# Patient Record
Sex: FEMALE | URBAN - METROPOLITAN AREA
[De-identification: names, ages, dates, MRNs, and addresses within clinical notes are randomized per-mention and may not be internally consistent; named-entity substitution may affect disease eponyms.]

---

## 2017-03-01 ASSESSMENT — ENCOUNTER SYMPTOMS
RESPIRATORY PAIN: 0
EYE WATERING: 0
MYALGIAS: 0
FLANK PAIN: 0
INCREASED ENERGY: 0
SHORTNESS OF BREATH: 0
SKIN CHANGES: 0
WEIGHT GAIN: 0
WEAKNESS: 0
POLYPHAGIA: 0
DYSPNEA ON EXERTION: 1
TROUBLE SWALLOWING: 0
JOINT SWELLING: 1
TASTE DISTURBANCE: 0
DIZZINESS: 1
EYE IRRITATION: 1
MUSCLE WEAKNESS: 0
HALLUCINATIONS: 0
SNORES LOUDLY: 0
BACK PAIN: 1
SORE THROAT: 0
CHILLS: 1
EYE REDNESS: 0
POLYDIPSIA: 0
SEIZURES: 0
HEADACHES: 0
TINGLING: 0
DIFFICULTY URINATING: 0
COUGH DISTURBING SLEEP: 1
NECK PAIN: 1
ALTERED TEMPERATURE REGULATION: 1
SPUTUM PRODUCTION: 1
SPEECH CHANGE: 0
HEMATURIA: 0
FEVER: 0
STIFFNESS: 1
NIGHT SWEATS: 0
MEMORY LOSS: 1
SINUS CONGESTION: 0
WEIGHT LOSS: 0
ARTHRALGIAS: 1
WHEEZING: 0
LOSS OF CONSCIOUSNESS: 0
TREMORS: 0
POSTURAL DYSPNEA: 0
HOARSE VOICE: 0
SMELL DISTURBANCE: 0
DYSURIA: 0
COUGH: 1
EYE PAIN: 1
DISTURBANCES IN COORDINATION: 1
POOR WOUND HEALING: 0
DOUBLE VISION: 0
MUSCLE CRAMPS: 0
SINUS PAIN: 0
NECK MASS: 0
PARALYSIS: 0
HEMOPTYSIS: 0
NUMBNESS: 0
FATIGUE: 1
DECREASED APPETITE: 0
NAIL CHANGES: 0

## 2017-03-03 ENCOUNTER — OFFICE VISIT (OUTPATIENT)
Dept: PULMONOLOGY | Facility: CLINIC | Age: 72
End: 2017-03-03
Attending: INTERNAL MEDICINE
Payer: MEDICARE

## 2017-03-03 VITALS
SYSTOLIC BLOOD PRESSURE: 147 MMHG | RESPIRATION RATE: 16 BRPM | OXYGEN SATURATION: 96 % | DIASTOLIC BLOOD PRESSURE: 85 MMHG | HEART RATE: 73 BPM

## 2017-03-03 DIAGNOSIS — R05.9 COUGH: ICD-10-CM

## 2017-03-03 DIAGNOSIS — J45.30 MILD PERSISTENT ASTHMA WITHOUT COMPLICATION: ICD-10-CM

## 2017-03-03 DIAGNOSIS — R05.3 CHRONIC COUGH: Primary | ICD-10-CM

## 2017-03-03 PROCEDURE — 99212 OFFICE O/P EST SF 10 MIN: CPT | Mod: ZF

## 2017-03-03 RX ORDER — CETIRIZINE HYDROCHLORIDE 10 MG/1
10 TABLET ORAL DAILY
COMMUNITY

## 2017-03-03 RX ORDER — HYDROCHLOROTHIAZIDE 12.5 MG/1
12.5 CAPSULE ORAL DAILY
COMMUNITY

## 2017-03-03 RX ORDER — TRAZODONE HYDROCHLORIDE 50 MG/1
TABLET, FILM COATED ORAL AT BEDTIME
COMMUNITY

## 2017-03-03 RX ORDER — L. ACIDOPHILUS/PECTIN, CITRUS 25MM-100MG
1 TABLET ORAL
COMMUNITY

## 2017-03-03 RX ORDER — ALBUTEROL SULFATE 90 UG/1
2 AEROSOL, METERED RESPIRATORY (INHALATION) EVERY 6 HOURS
COMMUNITY

## 2017-03-03 RX ORDER — ATORVASTATIN CALCIUM 40 MG/1
40 TABLET, FILM COATED ORAL DAILY
COMMUNITY

## 2017-03-03 RX ORDER — CHOLECALCIFEROL (VITAMIN D3) 25 MCG
2000 CAPSULE ORAL
COMMUNITY

## 2017-03-03 RX ORDER — CLONAZEPAM 0.5 MG/1
0.5 TABLET ORAL 2 TIMES DAILY PRN
COMMUNITY

## 2017-03-03 RX ORDER — HYDROCODONE BITARTRATE AND ACETAMINOPHEN 5; 325 MG/1; MG/1
1 TABLET ORAL PRN
COMMUNITY

## 2017-03-03 RX ORDER — BUDESONIDE AND FORMOTEROL FUMARATE DIHYDRATE 80; 4.5 UG/1; UG/1
2 AEROSOL RESPIRATORY (INHALATION) 2 TIMES DAILY
Status: ON HOLD | COMMUNITY
End: 2017-03-22

## 2017-03-03 RX ORDER — PSEUDOEPHEDRINE HCL 120 MG/1
120 TABLET, FILM COATED, EXTENDED RELEASE ORAL EVERY 12 HOURS
COMMUNITY

## 2017-03-03 RX ORDER — GABAPENTIN 300 MG/1
600 CAPSULE ORAL 3 TIMES DAILY
Status: ON HOLD | COMMUNITY
End: 2017-03-22

## 2017-03-03 RX ORDER — OMEPRAZOLE 40 MG/1
CAPSULE, DELAYED RELEASE ORAL DAILY
COMMUNITY

## 2017-03-03 RX ORDER — CODEINE PHOSPHATE AND GUAIFENESIN 10; 100 MG/5ML; MG/5ML
1-2 SOLUTION ORAL EVERY 4 HOURS PRN
COMMUNITY

## 2017-03-03 RX ORDER — FLUOXETINE 10 MG/1
50 CAPSULE ORAL DAILY
COMMUNITY

## 2017-03-03 RX ORDER — LOSARTAN POTASSIUM 100 MG/1
100 TABLET ORAL DAILY
COMMUNITY

## 2017-03-03 RX ORDER — BUPROPION HYDROCHLORIDE 150 MG/1
150 TABLET ORAL EVERY MORNING
COMMUNITY

## 2017-03-03 RX ORDER — SENNA LEAF EXTRACT 176MG/5ML
SYRUP ORAL
COMMUNITY

## 2017-03-03 RX ORDER — TIMOLOL MALEATE 2.5 MG/ML
1 SOLUTION OPHTHALMIC EVERY MORNING
COMMUNITY

## 2017-03-03 ASSESSMENT — PAIN SCALES - GENERAL: PAINLEVEL: NO PAIN (0)

## 2017-03-03 NOTE — NURSING NOTE
Chief Complaint   Patient presents with     Cough     Patient is being seen for second opinion consultation of cough      Kathi Melendez CMA at 1:57 PM on 3/3/2017

## 2017-03-03 NOTE — MR AVS SNAPSHOT
After Visit Summary   3/3/2017    Davida Reynaga    MRN: [id number]           Patient Information     Date Of Birth          1945        Visit Information        Provider Department      3/3/2017 2:20 PM Cheng Walker MD [facility]        Today's Diagnoses     Chronic cough    -  1    Mild persistent asthma without complication          Care Instructions    I think it is important for us to try and pinpoint WHY you are having your cough.  As next steps to pin this down:  -I would like to get a formal echocardiogram to rule out any pulmonary hypertension based on the trends I saw in your PFTs.  -I would like to get a high resolution CT scan of the neck and your chest  -I would like to get specific blood work:   -IGE levels   -CBC w/ differential   -Thyroid studies   -Aspergillus related IGE   -Exhaled nitric oxide testing     -I would like you to undergo bronchoscopy to evaluate your vocal cords, biopsy your lung tissue looking for eosinophilic bronchitis, and also to do a small lavage (fluid sample) looking for a chronic infection.    -With your singing background, I have sent referrals to Dr. Villanueva and the speech pathology department.              Follow-ups after your visit        Follow-up notes from your care team     Return in about 4 weeks (around 3/31/2017).      Your next 10 appointments already scheduled     Mar 31, 2017 10:00 AM CDT   Lab with  LAB   [facility] ([facility])    [address] [place] [id number] [phone]            Mar 31, 2017 12:00 PM CDT   FULL PULMONARY FUNCTION with  PFL B   [facility] Pulmonary Function Testing ([facility])    [address] [place] [id number]   [phone]            Mar 31, 2017  1:20 PM CDT   (Arrive by 1:05 PM)   Return Visit with Cheng Walker MD   [facility] for Lung  [facility] ([facility])    [address] [facility] [address] [facility]  [address] [facility] [id number] [phone]              Future tests that were ordered for you today     Open Future Orders        Priority Expected Expires Ordered    CT Soft Tissue Neck w/o Contrast Routine  3/3/2018 3/3/2017    Echocardiogram Complete Routine  3/3/2018 3/3/2017    CT Chest w/o Contrast Routine  3/3/2018 3/3/2017    CBC with platelets differential Routine  4/2/2017 3/3/2017    General PFT Lab (Please always keep checked) Routine  3/3/2018 3/3/2017    IgE Routine  4/2/2017 3/3/2017    Aspergillus Specific IgG & IgE: Laboratory Miscellaneous Order Routine  4/2/2017 3/3/2017    Pulmonary Function Test Routine  3/3/2018 3/3/2017    Exhaled Nitric Oxide - FENO Routine  3/3/2018 3/3/2017            Who to contact     If you have questions or need follow up information about today's clinic visit or your schedule please contact [facility] FOR LUNG SCIENCE AND HEALTH directly at [phone].  Normal or non-critical lab and imaging results will be communicated to you by [employer], letter or phone within 4 business days after the clinic has received the results. If you do not hear from us within 7 days, please contact the clinic through [employer] or phone. If you have a critical or abnormal lab result, we will notify you by phone as soon as possible.  Submit refill requests through [employer] or call your pharmacy and they will forward the refill request to us. Please allow 3 business days for your refill to be completed.          Additional Information About Your Visit        [employer] Information     [employer] gives you secure access to your electronic health record. If you see a primary care provider, you can also send messages to your care team and make appointments. If you have questions, please call your primary care clinic.  If you do not have a primary care provider, please call [phone] and they will assist  you.        Care EveryWhere ID     This is your Care EveryWhere ID. This could be used by other organizations to access your [facility] medical records  [id number]        Your Vitals Were     Pulse Respirations Pulse Oximetry             73 16 96%          Blood Pressure from Last 3 Encounters:   03/03/17 147/85    Weight from Last 3 Encounters:   No data found for Wt              We Performed the Following     BRONCHOSCOPY W BIOPSY, SINGLE/MULT SITES        Primary Care Provider    None Specified       No primary provider on file.        Thank you!     Thank you for choosing [facility] L[facility] SCI[facility] HE[facility]  for your care. Our goal is always to provide you with excellent care. Hearing back from our patients is one way we can continue to improve our services. Please take a few minutes to complete the written survey that you may receive in the mail after your visit with us. Thank you!             Your Updated Medication List - Protect others around you: Learn how to safely use, store and throw away your medicines at www.disposemymeds.org.          This list is accurate as of: 3/3/17  3:47 PM.  Always use your most recent med list.                   Brand Name Dispense Instructions for use    albuterol 108 (90 BASE) MCG/ACT Inhaler    PROAIR HFA/PROVENTIL HFA/VENTOLIN HFA     Inhale 2 puffs into the lungs every 6 hours       aspirin 81 MG tablet      Take by mouth daily       atorvastatin 40 MG tablet    LIPITOR     Take 40 mg by mouth daily       budesonide-formoterol 80-4.5 MCG/ACT Inhaler    SYMBICORT     Inhale 2 puffs into the lungs 2 times daily       calcium-vitamin D 600-400 MG-UNIT per tablet    CALTRATE     Take 1 tablet by mouth 2 times daily       cetirizine 10 MG tablet    zyrTEC     Take 10 mg by mouth daily       clonazePAM 0.5 MG tablet    klonoPIN     Take 0.5 mg by mouth 2 times daily as needed for anxiety       diphenhydrAMINE-acetaminophen  MG tablet    TYLENOL PM     Take 1 tablet  by mouth nightly as needed for sleep       gabapentin 300 MG capsule    NEURONTIN     Take 600 mg by mouth 3 times daily       guaiFENesin-codeine 100-10 MG/5ML Soln solution    ROBITUSSIN AC     Take 1-2 tsp. by mouth every 4 hours as needed for cough       hydrochlorothiazide 12.5 MG capsule    MICROZIDE     Take 12.5 mg by mouth daily       HYDROcodone-acetaminophen 5-325 MG per tablet    NORCO     Take 1 tablet by mouth as needed for moderate to severe pain       lactobacillus acidophilus Tabs      Take 1 tablet by mouth 3 times daily (before meals)       losartan 100 MG tablet    COZAAR     Take 100 mg by mouth daily       MELATONIN PO      Take 10 mg by mouth       NASACORT AQ NA          omeprazole 40 MG capsule    priLOSEC     Take by mouth daily       PROZAC 10 MG capsule   Generic drug:  FLUoxetine      Take 50 mg by mouth daily       ranitidine 150 MG tablet    ZANTAC     Take by mouth 2 times daily       Senna 176 MG/5ML Syrp          SUDAFED 12 HOUR 120 MG 12 hr tablet   Generic drug:  pseudoePHEDrine      Take 120 mg by mouth every 12 hours       THERAPEUTIC M PO          timolol 0.25 % ophthalmic gel-form    TIMOPTIC-XE     1 drop every morning       traZODone 50 MG tablet    DESYREL     Take by mouth At Bedtime       Vitamin D-3 1000 UNITS Caps      Take 2,000 Units by mouth       WELLBUTRIN  MG 24 hr tablet   Generic drug:  buPROPion      Take 150 mg by mouth every morning

## 2017-03-03 NOTE — LETTER
"3/3/2017       RE: Davida Reynaga  [address]  [facility] [address]     Dear Colleague,    Thank you for referring your patient, Davida Reynaga, to the [facility] FOR LUNG SCIENCE [facility] HE[facility] at [facility]. Please see a copy of my visit note below.    Pulmonary Clinic Initial Visit Note    CC: \"Chronic Cough\"      HPI:   Ms. Reynaga is a pleasant 70 yo woman with a long history of difficult to control chronic cough who was sent to us by Dr. Rizzo from [facility] in [place] for a second opinion.  Dr. Rizzo has done an impressively thorough evaluation but Ms. Reynaga's cough continues/is slowly worsening, which prompted the consult.    Davida describes the cough as first starting in the 90s, primarily after singing and voice straining.  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  She continued on injections for a year or two and then attempted to wean off, at which time her cough recurred.  Her PCP then started her on oral allergy treatment thinking this was again due to her cat allergy.    The dry cough continued with increasing frequency of coughing jags, and she was ultimately referred to the [facility] system in the late 90s where she saw a Dr. Mayfield (sp?).  She does not recognized the name Dr. Gordon.  At [facility] she underwent extensive evaluation to include bronchoscopy but no biopsies to my knowledge.  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  Chart not available to me) and she has since been managed on intermittent inhalers, oral allergy medications and occasional courses of antibiotics.      She states currently that the cough is worst during the day with almost no coughing at night.  It's worse with singing/voice projection and also " sometimes after eating.  She does not get working up at night with the cough and her partner has not noticed nocturnal cough.  In the past few years she has transitioned from a dry cough to a productive cough where she will clear clear to yellow sputum 5-10 times per day.  Bending over does not trigger her cough, she has been seen by speech path with no evidence of aspiration, and she has not had improvement in her cough with LABA/ICS inhalers, TENA inhalers, or courses of prednisone.    In the past she has noticed improvement but not resolution of her symptoms while she is on antibiotics.  When discussing her cat allergy, she has been on vacations away from the cat for up to 2 weeks iwht no change/improvement in her cough.      PMH:  Reviewed from paper chart being scanned in.      Allergies:  Allergies   Allergen Reactions     Ciprofloxacin        Social History:  Social History     Social History     Marital status: Single     Spouse name: N/A     Number of children: N/A     Years of education: N/A     Occupational History     Not on file.     Social History Main Topics     Smoking status: Never Smoker     Smokeless tobacco: Not on file     Alcohol use Not on file     Drug use: Not on file     Sexual activity: Not on file     Other Topics Concern     Not on file     Social History Narrative     No narrative on file   Works as a psychiatrist.  Confirmed never smoker.  Significant second hand smoke history from family.      Medications:  Current Outpatient Prescriptions   Medication Sig Dispense Refill     aspirin 81 MG tablet Take by mouth daily       atorvastatin (LIPITOR) 40 MG tablet Take 40 mg by mouth daily       clonazePAM (KLONOPIN) 0.5 MG tablet Take 0.5 mg by mouth 2 times daily as needed for anxiety       hydrochlorothiazide (MICROZIDE) 12.5 MG capsule Take 12.5 mg by mouth daily       losartan (COZAAR) 100 MG tablet Take 100 mg by mouth daily       MELATONIN PO Take 10 mg by mouth       omeprazole  (PRILOSEC) 40 MG capsule Take by mouth daily       FLUoxetine (PROZAC) 10 MG capsule Take 50 mg by mouth daily       ranitidine (ZANTAC) 150 MG tablet Take by mouth 2 times daily       Senna 176 MG/5ML SYRP        pseudoePHEDrine (SUDAFED 12 HOUR) 120 MG 12 hr tablet Take 120 mg by mouth every 12 hours       Multiple Vitamins-Minerals (THERAPEUTIC M PO)        timolol (TIMOPTIC-XE) 0.25 % ophthalmic gel-form 1 drop every morning       traZODone (DESYREL) 50 MG tablet Take by mouth At Bedtime       Cholecalciferol (VITAMIN D-3) 1000 UNITS CAPS Take 2,000 Units by mouth       buPROPion (WELLBUTRIN XL) 150 MG 24 hr tablet Take 150 mg by mouth every morning       guaiFENesin-codeine (ROBITUSSIN AC) 100-10 MG/5ML SOLN solution Take 1-2 tsp. by mouth every 4 hours as needed for cough       HYDROcodone-acetaminophen (NORCO) 5-325 MG per tablet Take 1 tablet by mouth as needed for moderate to severe pain       albuterol (PROAIR HFA/PROVENTIL HFA/VENTOLIN HFA) 108 (90 BASE) MCG/ACT Inhaler Inhale 2 puffs into the lungs every 6 hours       budesonide-formoterol (SYMBICORT) 80-4.5 MCG/ACT Inhaler Inhale 2 puffs into the lungs 2 times daily       cetirizine (ZYRTEC) 10 MG tablet Take 10 mg by mouth daily       Triamcinolone Acetonide (NASACORT AQ NA)        gabapentin (NEURONTIN) 300 MG capsule Take 600 mg by mouth 3 times daily       diphenhydrAMINE-acetaminophen (TYLENOL PM)  MG tablet Take 1 tablet by mouth nightly as needed for sleep       Lactobacillus Acid-Pectin (LACTOBACILLUS ACIDOPHILUS) TABS Take 1 tablet by mouth 3 times daily (before meals)       calcium-vitamin D (CALTRATE) 600-400 MG-UNIT per tablet Take 1 tablet by mouth 2 times daily         Family History:  Vascular disease, tobacco abuse, coronary disease.      ROS: Complete 10 point ROS negative unless mentioned in HPI    Physical Exam:  /85 (BP Location: Right arm, Patient Position: Chair, Cuff Size: Adult Regular)  Pulse 73  Resp 16  SpO2  96%    General: Sitting in the chair in NAD  HEENT: anicteric, moist mucosa.  Upper airway wheeze on forced exhalation but not during quiet tidal breathing.  Neck: no palpable lymphadenopathy, no JVD noted  Chest: CTAB, no wheezing in the lower airways and no wheeze on forced expiration.    Cardiac: RRR no murmurs  Abdomen: Soft, flat, non tender, active BS  Extremities: No LE Edema  Neuro: A&Ox3, no focal defecits  Skin: no rash noted        Labs and Radiology:  CXR:   FINDINGS: Cardiomediastinal silhouette and pulmonary vasculature are  within normal limits. No acute pulmonary opacity. No pneumothorax.  Blunting of the posterior left costophrenic angle. Upper abdomen is  within normal limits.    Outside CT reviewed:  No signs of ILD, few tree in bud findings in the RUL, BI and RLL with bronchial wall thickening.      PFT's:  Order   General PFT Lab (Please always keep checked) [PFT13] (Order [id number])   Exam Information   Exam Date Exam Time Accession # Results    3/3/17 12:35 PM     Linked Documents   View Image   Component Results   Component Value Flag Ref Range Units Status Collected Lab   FVC-Pred 2.54   L  03/03/2017 12:35    FVC-Pre 2.21   L  03/03/2017 12:35    FVC-%Pred-Pre 87   %  03/03/2017 12:35    FEV1-Pre 1.49   L  03/03/2017 12:35    FEV1-%Pred-Pre 74   %  03/03/2017 12:35    FEV1FVC-Pred 79   %  03/03/2017 12:35    FEV1FVC-Pre 67   %  03/03/2017 12:35    FEFMax-Pred 5.53   L/sec  03/03/2017 12:35    FEFMax-Pre 4.38   L/sec  03/03/2017 12:35    FEFMax-%Pred-Pre 79   %  03/03/2017 12:35    FEF2575-Pred 1.75   L/sec  03/03/2017 12:35    FEF2575-Pre 0.83   L/sec  03/03/2017 12:35    VPX5454-%Pred-Pre 47   %  03/03/2017 12:35    ExpTime-Pre 8.97   sec  03/03/2017 12:35    FIFMax-Pre 5.01   L/sec  03/03/2017 12:35    VC-Pred 2.54   L  03/03/2017 12:35    VC-Pre 2.08   L  03/03/2017 12:35   "  VC-%Pred-Pre 81   %  03/03/2017 12:35    IC-Pred 2.53   L  03/03/2017 12:35    IC-Pre 1.52   L  03/03/2017 12:35    IC-%Pred-Pre 60   %  03/03/2017 12:35    ERV-Pred 0.01   L  03/03/2017 12:35    ERV-Pre 0.55   L  03/03/2017 12:35    ERV-%Pred-Pre 5543   %  03/03/2017 12:35    FEV1FEV6-Pred 79   %  03/03/2017 12:35    FEV1FEV6-Pre 69   %  03/03/2017 12:35    FRCPleth-Pred 2.65   L  03/03/2017 12:35    FRCPleth-Pre 2.76   L  03/03/2017 12:35    FRCPleth-%Pred-Pre 104   %  03/03/2017 12:35    RVPleth-Pred 2.01   L  03/03/2017 12:35    RVPleth-Pre 2.20   L  03/03/2017 12:35    RVPleth-%Pred-Pre 109   %  03/03/2017 12:35    TLCPleth-Pred 4.77   L  03/03/2017 12:35    TLCPleth-Pre 4.28   L  03/03/2017 12:35    TLCPleth-%Pred-Pre 89   %  03/03/2017 12:35    DLCOunc-Pred 20.17   ml/min/mmHg  03/03/2017 12:35    DLCOunc-Pre 18.03   ml/min/mmHg  03/03/2017 12:35    DLCOunc-%Pred-Pre 89   %  03/03/2017 12:35    VA-Pre 3.65   L  03/03/2017 12:35    VA-%Pred-Pre 74   %  03/03/2017 12:35    FEV1SVC-Pred 79   %  03/03/2017 12:35    FEV1SVC-Pre 72   %  03/03/2017 12:35            Assessment and Plan:  Davida Reynaga is a 71 year old female with chronic cough presenting to clinic today for second opinion.    She has had a quite thorough evaluation through Dr. Rizzo so at this point we are looking for uncommon and atypical causes.  At the start of our visit I did explain that some cough remains undiagnosed and difficult to \"cure\" despite best medical care and current thinking, but that speech pathology can help to make cough more controllable.      This does not seem to behave as typical cough variant asthma, though she has mild obstruction on PFTs.  I suspect the PFT findings are more due to her unique breathing/diaphragmatic strength pattern from a lifetime of singing than " "from small airway disease given the lack of wheeze on exam, and her slightly below average DLCO would support that more than a standard asthma diagnosis.  In either case, she did not get significant improvement while on standard asthma inhalers or courses of prednisone, so I think an allergy/asthma trigger is unlikely.  We briefly discussed that her cats could be causing ongoing symptoms but would recommend exploring all other sources before recommending a \"cat holiday\".      She has no bronchiectasis on imaging but does have some bronchial wall irritation/thickening as well as subtle tree-in-bed findings on her previous CT scan.  With her increased sputum production in recent years and no recent bronchoscopy (The [place] bronch was done a decade before she developed sputum) we will plan on bronchoscopy with biopsies as well as lavage, looking for eosinophilic bronchitis, possible MAC or other non-TB mycobacterium, or chronic bacterial colonization.  With her upper airway expiratory wheeze, a vocal cord eval during the bronch will also be helpful.      -Formal echo to confirm no pulmonary Htn   -IGE levels and aspergillus IGE to rule out ABPA  -CBC with diff looking for eosinophil count  -Thyroid studies  -JENNIFER testing.    -Referrals to ENT and speech path  -High res CT Chest    Seen and staffed with Dr. Ly.    Francis Walker MD  Pulmonary and Critical Care Fellow  [phone]    Pulmonary Attending Attestation  I saw and examined the patient with Dr. Walker, confirming key aspects of the history and exam.  I personally reviewed the recent Xrays and other labs.  The fellow s note reflects our detailed discussion of the findings, assessment and plan.  Prolonged h/o chronic cough in this very pleasant 72 yo former mg with voice problems.  Her story is prolonged and complex and her symptoms rate ~ 6/10 on a life distress/not worth living scale.  She should have ENT evaluatino by Dr. Judy Villanueva and be seen at " voice clinic by their speech pathologist.  Rest of w/u as outlined by Dr. Walker.  May need formal allergy evaluation also.  I did a literature review on speech pathology Rx and/or cognitive behavioral Rx for chronic cough and shared the articles with Dr. Walker and some of the information with the patient and her partner.  Elfego Ly MD      Again, thank you for allowing me to participate in the care of your patient.      Sincerely,    Cheng Walker MD

## 2017-03-03 NOTE — PATIENT INSTRUCTIONS
I think it is important for us to try and pinpoint WHY you are having your cough.  As next steps to pin this down:  -I would like to get a formal echocardiogram to rule out any pulmonary hypertension based on the trends I saw in your PFTs.  -I would like to get a high resolution CT scan of the neck and your chest  -I would like to get specific blood work:   -IGE levels   -CBC w/ differential   -Thyroid studies   -Aspergillus related IGE   -Exhaled nitric oxide testing     -I would like you to undergo bronchoscopy to evaluate your vocal cords, biopsy your lung tissue looking for eosinophilic bronchitis, and also to do a small lavage (fluid sample) looking for a chronic infection.    -With your singing background, I have sent referrals to Dr. Villanueva and the speech pathology department.

## 2017-03-06 NOTE — PROGRESS NOTES
"Pulmonary Clinic Initial Visit Note    CC: \"Chronic Cough\"      HPI:   Ms. Reynaga is a pleasant 72 yo woman with a long history of difficult to control chronic cough who was sent to us by Dr. Rizzo from [facility] in [place] for a second opinion.  Dr. Rizzo has done an impressively thorough evaluation but Ms. Reynaga's cough continues/is slowly worsening, which prompted the consult.    Davida describes the cough as first starting in the 90s, primarily after singing and voice straining.  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  She continued on injections for a year or two and then attempted to wean off, at which time her cough recurred.  Her PCP then started her on oral allergy treatment thinking this was again due to her cat allergy.    The dry cough continued with increasing frequency of coughing jags, and she was ultimately referred to the [facility] system in the late 90s where she saw a Dr. Mayfield (sp?).  She does not recognized the name Dr. Gordon.  At [facility] she underwent extensive evaluation to include bronchoscopy but no biopsies to my knowledge.  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  Chart not available to me) and she has since been managed on intermittent inhalers, oral allergy medications and occasional courses of antibiotics.      She states currently that the cough is worst during the day with almost no coughing at night.  It's worse with singing/voice projection and also sometimes after eating.  She does not get working up at night with the cough and her partner has not noticed nocturnal cough.  In the past few years she has transitioned from a dry cough to a productive cough where she will clear clear to yellow sputum 5-10 times per day.  Bending over does not " trigger her cough, she has been seen by speech path with no evidence of aspiration, and she has not had improvement in her cough with LABA/ICS inhalers, TENA inhalers, or courses of prednisone.    In the past she has noticed improvement but not resolution of her symptoms while she is on antibiotics.  When discussing her cat allergy, she has been on vacations away from the cat for up to 2 weeks iwht no change/improvement in her cough.      PMH:  Reviewed from paper chart being scanned in.      Allergies:  Allergies   Allergen Reactions     Ciprofloxacin        Social History:  Social History     Social History     Marital status: Single     Spouse name: N/A     Number of children: N/A     Years of education: N/A     Occupational History     Not on file.     Social History Main Topics     Smoking status: Never Smoker     Smokeless tobacco: Not on file     Alcohol use Not on file     Drug use: Not on file     Sexual activity: Not on file     Other Topics Concern     Not on file     Social History Narrative     No narrative on file   Works as a psychiatrist.  Confirmed never smoker.  Significant second hand smoke history from family.      Medications:  Current Outpatient Prescriptions   Medication Sig Dispense Refill     aspirin 81 MG tablet Take by mouth daily       atorvastatin (LIPITOR) 40 MG tablet Take 40 mg by mouth daily       clonazePAM (KLONOPIN) 0.5 MG tablet Take 0.5 mg by mouth 2 times daily as needed for anxiety       hydrochlorothiazide (MICROZIDE) 12.5 MG capsule Take 12.5 mg by mouth daily       losartan (COZAAR) 100 MG tablet Take 100 mg by mouth daily       MELATONIN PO Take 10 mg by mouth       omeprazole (PRILOSEC) 40 MG capsule Take by mouth daily       FLUoxetine (PROZAC) 10 MG capsule Take 50 mg by mouth daily       ranitidine (ZANTAC) 150 MG tablet Take by mouth 2 times daily       Senna 176 MG/5ML SYRP        pseudoePHEDrine (SUDAFED 12 HOUR) 120 MG 12 hr tablet Take 120 mg by mouth every 12  hours       Multiple Vitamins-Minerals (THERAPEUTIC M PO)        timolol (TIMOPTIC-XE) 0.25 % ophthalmic gel-form 1 drop every morning       traZODone (DESYREL) 50 MG tablet Take by mouth At Bedtime       Cholecalciferol (VITAMIN D-3) 1000 UNITS CAPS Take 2,000 Units by mouth       buPROPion (WELLBUTRIN XL) 150 MG 24 hr tablet Take 150 mg by mouth every morning       guaiFENesin-codeine (ROBITUSSIN AC) 100-10 MG/5ML SOLN solution Take 1-2 tsp. by mouth every 4 hours as needed for cough       HYDROcodone-acetaminophen (NORCO) 5-325 MG per tablet Take 1 tablet by mouth as needed for moderate to severe pain       albuterol (PROAIR HFA/PROVENTIL HFA/VENTOLIN HFA) 108 (90 BASE) MCG/ACT Inhaler Inhale 2 puffs into the lungs every 6 hours       budesonide-formoterol (SYMBICORT) 80-4.5 MCG/ACT Inhaler Inhale 2 puffs into the lungs 2 times daily       cetirizine (ZYRTEC) 10 MG tablet Take 10 mg by mouth daily       Triamcinolone Acetonide (NASACORT AQ NA)        gabapentin (NEURONTIN) 300 MG capsule Take 600 mg by mouth 3 times daily       diphenhydrAMINE-acetaminophen (TYLENOL PM)  MG tablet Take 1 tablet by mouth nightly as needed for sleep       Lactobacillus Acid-Pectin (LACTOBACILLUS ACIDOPHILUS) TABS Take 1 tablet by mouth 3 times daily (before meals)       calcium-vitamin D (CALTRATE) 600-400 MG-UNIT per tablet Take 1 tablet by mouth 2 times daily         Family History:  Vascular disease, tobacco abuse, coronary disease.      ROS: Complete 10 point ROS negative unless mentioned in HPI    Physical Exam:  /85 (BP Location: Right arm, Patient Position: Chair, Cuff Size: Adult Regular)  Pulse 73  Resp 16  SpO2 96%    General: Sitting in the chair in NAD  HEENT: anicteric, moist mucosa.  Upper airway wheeze on forced exhalation but not during quiet tidal breathing.  Neck: no palpable lymphadenopathy, no JVD noted  Chest: CTAB, no wheezing in the lower airways and no wheeze on forced expiration.    Cardiac: RRR  no murmurs  Abdomen: Soft, flat, non tender, active BS  Extremities: No LE Edema  Neuro: A&Ox3, no focal defecits  Skin: no rash noted        Labs and Radiology:  CXR:   FINDINGS: Cardiomediastinal silhouette and pulmonary vasculature are  within normal limits. No acute pulmonary opacity. No pneumothorax.  Blunting of the posterior left costophrenic angle. Upper abdomen is  within normal limits.    Outside CT reviewed:  No signs of ILD, few tree in bud findings in the RUL, BI and RLL with bronchial wall thickening.      PFT's:  Order   General PFT Lab (Please always keep checked) [PFT13] (Order [id number])   Exam Information   Exam Date Exam Time Accession # Results    3/3/17 12:35 PM     Linked Documents   View Image   Component Results   Component Value Flag Ref Range Units Status Collected Lab   FVC-Pred 2.54   L  03/03/2017 12:35    FVC-Pre 2.21   L  03/03/2017 12:35    FVC-%Pred-Pre 87   %  03/03/2017 12:35    FEV1-Pre 1.49   L  03/03/2017 12:35    FEV1-%Pred-Pre 74   %  03/03/2017 12:35    FEV1FVC-Pred 79   %  03/03/2017 12:35    FEV1FVC-Pre 67   %  03/03/2017 12:35    FEFMax-Pred 5.53   L/sec  03/03/2017 12:35    FEFMax-Pre 4.38   L/sec  03/03/2017 12:35    FEFMax-%Pred-Pre 79   %  03/03/2017 12:35    FEF2575-Pred 1.75   L/sec  03/03/2017 12:35    FEF2575-Pre 0.83   L/sec  03/03/2017 12:35    QSZ2837-%Pred-Pre 47   %  03/03/2017 12:35    ExpTime-Pre 8.97   sec  03/03/2017 12:35    FIFMax-Pre 5.01   L/sec  03/03/2017 12:35    VC-Pred 2.54   L  03/03/2017 12:35    VC-Pre 2.08   L  03/03/2017 12:35    VC-%Pred-Pre 81   %  03/03/2017 12:35    IC-Pred 2.53   L  03/03/2017 12:35    IC-Pre 1.52   L  03/03/2017 12:35    IC-%Pred-Pre 60   %  03/03/2017 12:35    ERV-Pred 0.01   L  03/03/2017 12:35    ERV-Pre 0.55   L  03/03/2017 12:35    ERV-%Pred-Pre 5543   %  03/03/2017  "12:35    FEV1FEV6-Pred 79   %  03/03/2017 12:35    FEV1FEV6-Pre 69   %  03/03/2017 12:35    FRCPleth-Pred 2.65   L  03/03/2017 12:35    FRCPleth-Pre 2.76   L  03/03/2017 12:35    FRCPleth-%Pred-Pre 104   %  03/03/2017 12:35    RVPleth-Pred 2.01   L  03/03/2017 12:35    RVPleth-Pre 2.20   L  03/03/2017 12:35    RVPleth-%Pred-Pre 109   %  03/03/2017 12:35    TLCPleth-Pred 4.77   L  03/03/2017 12:35    TLCPleth-Pre 4.28   L  03/03/2017 12:35    TLCPleth-%Pred-Pre 89   %  03/03/2017 12:35    DLCOunc-Pred 20.17   ml/min/mmHg  03/03/2017 12:35    DLCOunc-Pre 18.03   ml/min/mmHg  03/03/2017 12:35    DLCOunc-%Pred-Pre 89   %  03/03/2017 12:35    VA-Pre 3.65   L  03/03/2017 12:35    VA-%Pred-Pre 74   %  03/03/2017 12:35    FEV1SVC-Pred 79   %  03/03/2017 12:35    FEV1SVC-Pre 72   %  03/03/2017 12:35            Assessment and Plan:  Davida Reynaga is a 71 year old female with chronic cough presenting to clinic today for second opinion.    She has had a quite thorough evaluation through Dr. Rizzo so at this point we are looking for uncommon and atypical causes.  At the start of our visit I did explain that some cough remains undiagnosed and difficult to \"cure\" despite best medical care and current thinking, but that speech pathology can help to make cough more controllable.      This does not seem to behave as typical cough variant asthma, though she has mild obstruction on PFTs.  I suspect the PFT findings are more due to her unique breathing/diaphragmatic strength pattern from a lifetime of singing than from small airway disease given the lack of wheeze on exam, and her slightly below average DLCO would support that more than a standard asthma diagnosis.  In either case, she did not get significant improvement while on standard asthma inhalers or courses of prednisone, so I think an allergy/asthma trigger " "is unlikely.  We briefly discussed that her cats could be causing ongoing symptoms but would recommend exploring all other sources before recommending a \"cat holiday\".      She has no bronchiectasis on imaging but does have some bronchial wall irritation/thickening as well as subtle tree-in-bed findings on her previous CT scan.  With her increased sputum production in recent years and no recent bronchoscopy (The [place] bronch was done a decade before she developed sputum) we will plan on bronchoscopy with biopsies as well as lavage, looking for eosinophilic bronchitis, possible MAC or other non-TB mycobacterium, or chronic bacterial colonization.  With her upper airway expiratory wheeze, a vocal cord eval during the bronch will also be helpful.      -Formal echo to confirm no pulmonary Htn   -IGE levels and aspergillus IGE to rule out ABPA  -CBC with diff looking for eosinophil count  -Thyroid studies  -JENNIFER testing.    -Referrals to ENT and speech path  -High res CT Chest    Seen and staffed with Dr. Ly.    Francis Walker MD  Pulmonary and Critical Care Fellow  [phone]    Pulmonary Attending Attestation  I saw and examined the patient with Dr. Walker, confirming key aspects of the history and exam.  I personally reviewed the recent Xrays and other labs.  The fellow s note reflects our detailed discussion of the findings, assessment and plan.  Prolonged h/o chronic cough in this very pleasant 72 yo former mg with voice problems.  Her story is prolonged and complex and her symptoms rate ~ 6/10 on a life distress/not worth living scale.  She should have ENT evaluatino by Dr. Judy Villanueva and be seen at voice clinic by their speech pathologist.  Rest of w/u as outlined by Dr. Walker.  May need formal allergy evaluation also.  I did a literature review on speech pathology Rx and/or cognitive behavioral Rx for chronic cough and shared the articles with Dr. Walker and some of the information with the patient " and her partner.  Elfego Ly MD      Answers for HPI/ROS submitted by the patient on 3/1/2017   General Symptoms: Yes  Skin Symptoms: Yes  HENT Symptoms: Yes  EYE SYMPTOMS: Yes  HEART SYMPTOMS: No  LUNG SYMPTOMS: Yes  INTESTINAL SYMPTOMS: No  URINARY SYMPTOMS: Yes  GYNECOLOGIC SYMPTOMS: No  BREAST SYMPTOMS: No  SKELETAL SYMPTOMS: Yes  BLOOD SYMPTOMS: No  NERVOUS SYSTEM SYMPTOMS: Yes  MENTAL HEALTH SYMPTOMS: No  Fever: No  Loss of appetite: No  Weight loss: No  Weight gain: No  Fatigue: Yes  Night sweats: No  Chills: Yes  Increased stress: No  Excessive hunger: No  Excessive thirst: No  Feeling hot or cold when others believe the temperature is normal: Yes  Loss of height: No  Post-operative complications: No  Surgical site pain: No  Hallucinations: No  Change in or Loss of Energy: No  Hyperactivity: No  Confusion: No  Changes in hair: No  Changes in moles/birth marks: No  Itching: Yes  Rashes: No  Changes in nails: No  Acne: No  Hair in places you don't want it: No  Change in facial hair: No  Warts: No  Non-healing sores: No  Scarring: No  Flaking of skin: Yes  Color changes of hands/feet in cold : No  Sun sensitivity: No  Skin thickening: No  Ear pain: No  Ear discharge: No  Hearing loss: No  Tinnitus: No  Nosebleeds: No  Congestion: No  Sinus pain: No  Trouble swallowing: No   Voice hoarseness: No  Mouth sores: No  Sore throat: No  Tooth pain: No  Gum tenderness: Yes  Bleeding gums: Yes  Change in taste: No  Change in sense of smell: No  Dry mouth: Yes  Hearing aid used: No  Neck lump: No  Eye pain: Yes  Vision loss: Yes  Dry eyes: No  Watery eyes: No  Eye bulging: No  Double vision: No  Flashing of lights: No  Spots: No  Floaters: No  Redness: No  Crossed eyes: No  Tunnel Vision: No  Yellowing of eyes: No  Eye irritation: Yes  Cough: Yes  Sputum or phlegm: Yes  Coughing up blood: No  Difficulty breating or shortness of breath: No  Snoring: No  Wheezing: No  Difficulty breathing on exertion: Yes  Respiratory  pain: No  Nighttime Cough: Yes  Difficulty breathing when lying flat: No  Trouble holding urine or incontinence: Yes  Pain or burning: No  Trouble starting or stopping: No  Increased frequency of urination: Yes  Blood in urine: No  Decreased frequency of urination: No  Frequent nighttime urination: No  Flank pain: No  Difficulty emptying bladder: No  Back pain: Yes  Muscle aches: No  Neck pain: Yes  Swollen joints: Yes  Joint pain: Yes  Bone pain: No  Muscle cramps: No  Muscle weakness: No  Joint stiffness: Yes  Bone fracture: No  Trouble with coordination: Yes  Dizziness or trouble with balance: Yes  Fainting or black-out spells: No  Memory loss: Yes  Headache: No  Seizures: No  Speech problems: No  Tingling: No  Tremor: No  Weakness: No  Difficulty walking: Yes  Paralysis: No  Numbness: No

## 2017-03-10 ENCOUNTER — CARE COORDINATION (OUTPATIENT)
Dept: PULMONOLOGY | Facility: CLINIC | Age: 72
End: 2017-03-10

## 2017-03-10 NOTE — PROGRESS NOTES
Writer contacted patient to review her upcoming bronchoscopy that is scheduled for 3/22/17 @ 12:00 (noon). Patient has received handout that was mailed to her home and has reviewed the information on there. Patient will have Khushihilda Carrizales as her  for the day of the procedure. Patient understands to check in at 11:00 at the Endoscopy suite at [facility]. Patient will hold her Aspirin starting on 3/16/17 per Dr. Walker's recommendations. Patient typically takes her blood pressure pills in the evening and will keep to this regimen for day/evening of procedure. Patient understands to be NPO after midnight the day of procedure. Patient denies any questions and already has a follow up visit scheduled for 3/31 with Dr. Walker.

## 2017-03-20 ENCOUNTER — PRE VISIT (OUTPATIENT)
Dept: OTOLARYNGOLOGY | Facility: CLINIC | Age: 72
End: 2017-03-20

## 2017-03-20 NOTE — TELEPHONE ENCOUNTER
1.  Date/reason for appt:  3/30/17   Chronic Cough    2.  Referring provider:  Dr Walker    3.  Call to patient (Yes / No - short description):  No, referred.  Records reviewed.  All records are in [facility] and imaging is in PACS.

## 2017-03-21 LAB
DLCOUNC-%PRED-PRE: 89 %
DLCOUNC-PRE: 18.03 ML/MIN/MMHG
DLCOUNC-PRED: 20.17 ML/MIN/MMHG
ERV-%PRED-PRE: 5543 %
ERV-PRE: 0.55 L
ERV-PRED: 0.01 L
EXPTIME-PRE: 8.97 SEC
FEF2575-%PRED-PRE: 47 %
FEF2575-PRE: 0.83 L/SEC
FEF2575-PRED: 1.75 L/SEC
FEFMAX-%PRED-PRE: 79 %
FEFMAX-PRE: 4.38 L/SEC
FEFMAX-PRED: 5.53 L/SEC
FEV1-%PRED-PRE: 74 %
FEV1-PRE: 1.49 L
FEV1FEV6-PRE: 69 %
FEV1FEV6-PRED: 79 %
FEV1FVC-PRE: 67 %
FEV1FVC-PRED: 79 %
FEV1SVC-PRE: 72 %
FEV1SVC-PRED: 79 %
FIFMAX-PRE: 5.01 L/SEC
FRCPLETH-%PRED-PRE: 104 %
FRCPLETH-PRE: 2.76 L
FRCPLETH-PRED: 2.65 L
FVC-%PRED-PRE: 87 %
FVC-PRE: 2.21 L
FVC-PRED: 2.54 L
IC-%PRED-PRE: 60 %
IC-PRE: 1.52 L
IC-PRED: 2.53 L
RVPLETH-%PRED-PRE: 109 %
RVPLETH-PRE: 2.2 L
RVPLETH-PRED: 2.01 L
TLCPLETH-%PRED-PRE: 89 %
TLCPLETH-PRE: 4.28 L
TLCPLETH-PRED: 4.77 L
VA-%PRED-PRE: 74 %
VA-PRE: 3.65 L
VC-%PRED-PRE: 81 %
VC-PRE: 2.08 L
VC-PRED: 2.54 L

## 2017-03-22 ENCOUNTER — HOSPITAL ENCOUNTER (OUTPATIENT)
Facility: CLINIC | Age: 72
Discharge: HOME | End: 2017-03-22
Attending: INTERNAL MEDICINE | Admitting: INTERNAL MEDICINE
Payer: MEDICARE

## 2017-03-22 ENCOUNTER — SURGERY (OUTPATIENT)
Age: 72
End: 2017-03-22

## 2017-03-22 VITALS
BODY MASS INDEX: 33.31 KG/M2 | OXYGEN SATURATION: 92 % | DIASTOLIC BLOOD PRESSURE: 124 MMHG | WEIGHT: 188 LBS | HEIGHT: 63 IN | SYSTOLIC BLOOD PRESSURE: 132 MMHG | RESPIRATION RATE: 22 BRPM

## 2017-03-22 LAB
APPEARANCE FLD: CLEAR
BRONCHOSCOPY: NORMAL
COLOR FLD: COLORLESS
GRAM STN SPEC: NORMAL
LYMPHOCYTES NFR FLD MANUAL: 6 %
MICRO REPORT STATUS: NORMAL
NEUTS BAND NFR FLD MANUAL: 3 %
OTHER CELLS FLD MANUAL: 91 %
SPECIMEN SOURCE FLD: NORMAL
SPECIMEN SOURCE: NORMAL
WBC # FLD AUTO: 47 /UL

## 2017-03-22 PROCEDURE — G0500 MOD SEDAT ENDO SERVICE >5YRS: HCPCS | Performed by: INTERNAL MEDICINE

## 2017-03-22 PROCEDURE — 87070 CULTURE OTHR SPECIMN AEROBIC: CPT | Performed by: INTERNAL MEDICINE

## 2017-03-22 PROCEDURE — 31624 DX BRONCHOSCOPE/LAVAGE: CPT | Performed by: INTERNAL MEDICINE

## 2017-03-22 PROCEDURE — 89051 BODY FLUID CELL COUNT: CPT | Performed by: INTERNAL MEDICINE

## 2017-03-22 PROCEDURE — 87206 SMEAR FLUORESCENT/ACID STAI: CPT | Performed by: INTERNAL MEDICINE

## 2017-03-22 PROCEDURE — 88305 TISSUE EXAM BY PATHOLOGIST: CPT | Performed by: INTERNAL MEDICINE

## 2017-03-22 PROCEDURE — 87205 SMEAR GRAM STAIN: CPT | Performed by: INTERNAL MEDICINE

## 2017-03-22 PROCEDURE — 87102 FUNGUS ISOLATION CULTURE: CPT | Performed by: INTERNAL MEDICINE

## 2017-03-22 PROCEDURE — 25000125 ZZHC RX 250: Performed by: INTERNAL MEDICINE

## 2017-03-22 PROCEDURE — 25000132 ZZH RX MED GY IP 250 OP 250 PS 637: Mod: GY | Performed by: INTERNAL MEDICINE

## 2017-03-22 PROCEDURE — 31625 BRONCHOSCOPY W/BIOPSY(S): CPT | Performed by: INTERNAL MEDICINE

## 2017-03-22 PROCEDURE — 87116 MYCOBACTERIA CULTURE: CPT | Performed by: INTERNAL MEDICINE

## 2017-03-22 PROCEDURE — 87015 SPECIMEN INFECT AGNT CONCNTJ: CPT | Performed by: INTERNAL MEDICINE

## 2017-03-22 PROCEDURE — 25000128 H RX IP 250 OP 636: Performed by: INTERNAL MEDICINE

## 2017-03-22 RX ORDER — FENTANYL CITRATE 50 UG/ML
INJECTION, SOLUTION INTRAMUSCULAR; INTRAVENOUS PRN
Status: DISCONTINUED | OUTPATIENT
Start: 2017-03-22 | End: 2017-03-22 | Stop reason: HOSPADM

## 2017-03-22 RX ORDER — ONDANSETRON 2 MG/ML
INJECTION INTRAMUSCULAR; INTRAVENOUS PRN
Status: DISCONTINUED | OUTPATIENT
Start: 2017-03-22 | End: 2017-03-22 | Stop reason: HOSPADM

## 2017-03-22 RX ADMIN — FENTANYL CITRATE 50 MCG: 50 INJECTION, SOLUTION INTRAMUSCULAR; INTRAVENOUS at 11:50

## 2017-03-22 RX ADMIN — LIDOCAINE HYDROCHLORIDE 9 MG: 40 INJECTION, SOLUTION RETROBULBAR; TOPICAL at 11:57

## 2017-03-22 RX ADMIN — MIDAZOLAM HYDROCHLORIDE 0.25 MG: 1 INJECTION, SOLUTION INTRAMUSCULAR; INTRAVENOUS at 11:56

## 2017-03-22 RX ADMIN — MIDAZOLAM HYDROCHLORIDE 1 MG: 1 INJECTION, SOLUTION INTRAMUSCULAR; INTRAVENOUS at 11:49

## 2017-03-22 RX ADMIN — ONDANSETRON 4 MG: 2 INJECTION INTRAMUSCULAR; INTRAVENOUS at 11:43

## 2017-03-22 RX ADMIN — LIDOCAINE HYDROCHLORIDE 9 ML: 10 INJECTION, SOLUTION EPIDURAL; INFILTRATION; INTRACAUDAL; PERINEURAL at 11:57

## 2017-03-22 NOTE — IP AVS SNAPSHOT
MRN:[id number]                      After Visit Summary   3/22/2017    Davida Reynaga    MRN: [id number]           Thank you!     Thank you for choosing [facility] for your care. Our goal is always to provide you with excellent care. Hearing back from our patients is one way we can continue to improve our services. Please take a few minutes to complete the written survey that you may receive in the mail after you visit with us. Thank you!        Patient Information     Date Of Birth          1945        About your hospital stay     You were admitted on:  March 22, 2017 You last received care in the:  [facility], Endoscopy    You were discharged on:  March 22, 2017       Who to Call     For medical emergencies, please call 911.  For non-urgent questions about your medical care, please call your primary care provider or clinic, None  For questions related to your surgery, please call your surgery clinic        Attending Provider     Provider Specialty    Cesario Cruz MD Internal Medicine       Primary Care Provider    None Specified       No primary provider on file.        Your next 10 appointments already scheduled     Mar 30, 2017 10:00 AM CDT   (Arrive by 9:45 AM)   New Patient Visit with BRANNON Thakkar   [facility] ([facility])    [address] [place] [id number] [phone]            Mar 31, 2017 10:00 AM CDT   Lab with  LAB   [facility] ([facility])    [address] [place] [id number] [phone]            Mar 31, 2017 10:20 AM CDT   (Arrive by 10:05 AM)   CT CHEST W/O CONTRAST with UCCT2   [facility] CT ([facility])    [address] [place] [id number] [phone]           Please bring any scans or X-rays taken at other hospitals, if similar tests were done. Also bring a list of your  medicines, including vitamins, minerals and over-the-counter drugs. It is safest to leave personal items at home.  Be sure to tell your doctor:   If you have any allergies.   If there s any chance you are pregnant.   If you are breastfeeding.   If you have any special needs.  You do not need to do anything special to prepare.  Please wear loose clothing, such as a sweat suit or jogging clothes. Avoid snaps, zippers and other metal. We may ask you to undress and put on a hospital gown.            Mar 31, 2017 11:00 AM CDT   Ech Complete with UCECHCR4   [facility] ([facility])    [address]  [address] [facility] [id number] [phone]           1.  Please bring or wear a comfortable two-piece outfit. 2.  You may eat, drink and take your normal medicines. 3.  For any questions that cannot be answered, please contact the ordering physician            Mar 31, 2017 12:00 PM CDT   FULL PULMONARY FUNCTION with  PFL B   [facility] Pulmonary Function Testing ([facility])    [address] [id number] [phone]            Mar 31, 2017 12:00 PM CDT   (Arrive by 11:45 AM)   CT SOFT TISSUE NECK W/O CONTRAST with UCCT2   [facility] CT ([facility])    [address] [id number] [phone]           Please bring any scans or X-rays taken at other hospitals, if similar tests were done. Also bring a list of your medicines, including vitamins, minerals and over-the-counter drugs. It is safest to leave personal items at home.  Be sure to tell your doctor:   If you have any allergies.   If there s any chance you are pregnant.   If you are breastfeeding.   If you have any special needs.  You do not need to do anything special to prepare.  Please wear loose clothing, such as a sweat suit or jogging clothes. Avoid snaps, zippers and other metal. We may ask you  "to undress and put on a hospital gown.            Mar 31, 2017  1:20 PM CDT   (Arrive by 1:05 PM)   Return Visit with Cheng Walker MD   [facility] ([facility])    [address] [address] [phone]              Further instructions from your care team       Discharge Instructions after Bronchoscopy    Activity  _x__ You had medicine to relax and for pain. You may feel dizzy or sleepy.  For 24 hours:    Do not drive or use heavy equipment.    Do not make important decisions.    Do not drink any alcohol.    Diet  _x__ When you can swallow easily, you may go back to your regular diet, medicines  and light exercise.    Follow-up  _x__ We took small tissue or fluid samples to study. We will call you with the results in about 10 business days.    Call right away if you have:    Unusual chest pain    Temperature above 100.6  F (37.5  C)    Coughing that does not stop.    If you have severe pain, bleeding, or shortness of breath, go to an emergency room.    If you have questions, call:  Monday to Friday, 7 a.m. to 4:30 p.m.  Endoscopy: [phone] (We may have to call you back)    After hours:  Hospital: [phone] (Ask for the pulmonary fellow on call)    Pending Results     No orders found from 3/20/2017 to 3/23/2017.            Admission Information     Date & Time Provider Department Dept. Phone    3/22/2017 Cesario Cruz MD [facility], Endoscopy [phone]      Your Vitals Were     Blood Pressure Respirations Height Weight Pulse Oximetry BMI (Body Mass Index)    127/116 16 1.6 m (5' 3\") 85.3 kg (188 lb) 98% 33.3 kg/m2      MyChart Information     [facility] gives you secure access to your electronic health record. If you see a primary care provider, you can also send messages to your care team and make appointments. If you have questions, please call your primary care clinic.  If you do not have a " primary care provider, please call [phone] and they will assist you.        Care EveryWhere ID     This is your Care EveryWhere ID. This could be used by other organizations to access your [facility] medical records  [id number]           Review of your medicines      UNREVIEWED medicines. Ask your doctor about these medicines        Dose / Directions    albuterol 108 (90 BASE) MCG/ACT Inhaler   Commonly known as:  PROAIR HFA/PROVENTIL HFA/VENTOLIN HFA        Dose:  2 puff   Inhale 2 puffs into the lungs every 6 hours Reported on 3/7/2017   Refills:  0       aspirin 81 MG tablet        Take by mouth daily   Refills:  0       atorvastatin 40 MG tablet   Commonly known as:  LIPITOR        Dose:  40 mg   Take 40 mg by mouth daily   Refills:  0       calcium-vitamin D 600-400 MG-UNIT per tablet   Commonly known as:  CALTRATE        Dose:  1 tablet   Take 1 tablet by mouth 2 times daily   Refills:  0       cetirizine 10 MG tablet   Commonly known as:  zyrTEC        Dose:  10 mg   Take 10 mg by mouth daily   Refills:  0       clonazePAM 0.5 MG tablet   Commonly known as:  klonoPIN        Dose:  0.5 mg   Take 0.5 mg by mouth 2 times daily as needed for anxiety   Refills:  0       guaiFENesin-codeine 100-10 MG/5ML Soln solution   Commonly known as:  ROBITUSSIN AC        Dose:  1-2 tsp.   Take 1-2 tsp. by mouth every 4 hours as needed for cough Reported on 3/7/2017   Refills:  0       hydrochlorothiazide 12.5 MG capsule   Commonly known as:  MICROZIDE        Dose:  12.5 mg   Take 12.5 mg by mouth daily   Refills:  0       HYDROcodone-acetaminophen 5-325 MG per tablet   Commonly known as:  NORCO        Dose:  1 tablet   Take 1 tablet by mouth as needed for moderate to severe pain Reported on 3/7/2017   Refills:  0       lactobacillus acidophilus Tabs        Dose:  1 tablet   Take 1 tablet by mouth 3 times daily (before meals)   Refills:  0       losartan 100 MG tablet   Commonly known as:  COZAAR        Dose:  100 mg    Take 100 mg by mouth daily   Refills:  0       MELATONIN PO        Dose:  10 mg   Take 10 mg by mouth   Refills:  0       NASACORT AQ NA        Refills:  0       omeprazole 40 MG capsule   Commonly known as:  priLOSEC        Take by mouth daily   Refills:  0       PROZAC 10 MG capsule   Generic drug:  FLUoxetine        Dose:  50 mg   Take 50 mg by mouth daily   Refills:  0       ranitidine 150 MG tablet   Commonly known as:  ZANTAC        Take by mouth 2 times daily   Refills:  0       Senna 176 MG/5ML Syrp        Refills:  0       SUDAFED 12 HOUR 120 MG 12 hr tablet   Generic drug:  pseudoePHEDrine        Dose:  120 mg   Take 120 mg by mouth every 12 hours Reported on 3/7/2017   Refills:  0       THERAPEUTIC M PO        Refills:  0       timolol 0.25 % ophthalmic gel-form   Commonly known as:  TIMOPTIC-XE        Dose:  1 drop   1 drop every morning   Refills:  0       traZODone 50 MG tablet   Commonly known as:  DESYREL        Take by mouth At Bedtime   Refills:  0       Vitamin D-3 1000 UNITS Caps        Dose:  2000 Units   Take 2,000 Units by mouth   Refills:  0       WELLBUTRIN  MG 24 hr tablet   Generic drug:  buPROPion        Dose:  150 mg   Take 150 mg by mouth every morning   Refills:  0                Protect others around you: Learn how to safely use, store and throw away your medicines at www.disposemymeds.org.             Medication List: This is a list of all your medications and when to take them. Check marks below indicate your daily home schedule. Keep this list as a reference.      Medications           Morning Afternoon Evening Bedtime As Needed    albuterol 108 (90 BASE) MCG/ACT Inhaler   Commonly known as:  PROAIR HFA/PROVENTIL HFA/VENTOLIN HFA   Inhale 2 puffs into the lungs every 6 hours Reported on 3/7/2017                                aspirin 81 MG tablet   Take by mouth daily                                atorvastatin 40 MG tablet   Commonly known as:  LIPITOR   Take 40 mg by  mouth daily                                calcium-vitamin D 600-400 MG-UNIT per tablet   Commonly known as:  CALTRATE   Take 1 tablet by mouth 2 times daily                                cetirizine 10 MG tablet   Commonly known as:  zyrTEC   Take 10 mg by mouth daily                                clonazePAM 0.5 MG tablet   Commonly known as:  klonoPIN   Take 0.5 mg by mouth 2 times daily as needed for anxiety                                guaiFENesin-codeine 100-10 MG/5ML Soln solution   Commonly known as:  ROBITUSSIN AC   Take 1-2 tsp. by mouth every 4 hours as needed for cough Reported on 3/7/2017                                hydrochlorothiazide 12.5 MG capsule   Commonly known as:  MICROZIDE   Take 12.5 mg by mouth daily                                HYDROcodone-acetaminophen 5-325 MG per tablet   Commonly known as:  NORCO   Take 1 tablet by mouth as needed for moderate to severe pain Reported on 3/7/2017                                lactobacillus acidophilus Tabs   Take 1 tablet by mouth 3 times daily (before meals)                                losartan 100 MG tablet   Commonly known as:  COZAAR   Take 100 mg by mouth daily                                MELATONIN PO   Take 10 mg by mouth                                NASACORT AQ NA                                omeprazole 40 MG capsule   Commonly known as:  priLOSEC   Take by mouth daily                                PROZAC 10 MG capsule   Take 50 mg by mouth daily   Generic drug:  FLUoxetine                                ranitidine 150 MG tablet   Commonly known as:  ZANTAC   Take by mouth 2 times daily                                Senna 176 MG/5ML Syrp                                SUDAFED 12 HOUR 120 MG 12 hr tablet   Take 120 mg by mouth every 12 hours Reported on 3/7/2017   Generic drug:  pseudoePHEDrine                                THERAPEUTIC M PO                                timolol 0.25 % ophthalmic gel-form   Commonly known as:   TIMOPTIC-XE   1 drop every morning                                traZODone 50 MG tablet   Commonly known as:  DESYREL   Take by mouth At Bedtime                                Vitamin D-3 1000 UNITS Caps   Take 2,000 Units by mouth                                WELLBUTRIN  MG 24 hr tablet   Take 150 mg by mouth every morning   Generic drug:  buPROPion

## 2017-03-22 NOTE — DISCHARGE INSTRUCTIONS
Discharge Instructions after Bronchoscopy    Activity  _x__ You had medicine to relax and for pain. You may feel dizzy or sleepy.  For 24 hours:    Do not drive or use heavy equipment.    Do not make important decisions.    Do not drink any alcohol.    Diet  _x__ When you can swallow easily, you may go back to your regular diet, medicines  and light exercise.    Follow-up  _x__ We took small tissue or fluid samples to study. We will call you with the results in about 10 business days.    Call right away if you have:    Unusual chest pain    Temperature above 100.6  F (37.5  C)    Coughing that does not stop.    If you have severe pain, bleeding, or shortness of breath, go to an emergency room.    If you have questions, call:  Monday to Friday, 7 a.m. to 4:30 p.m.  Endoscopy: [phone] (We may have to call you back)    After hours:  Hospital: [phone] (Ask for the pulmonary fellow on call)

## 2017-03-23 LAB — COPATH REPORT: NORMAL

## 2017-03-24 LAB
ACID FAST STN SPEC QL: NORMAL
BACTERIA SPEC CULT: NORMAL
MICRO REPORT STATUS: NORMAL
MICRO REPORT STATUS: NORMAL
SPECIMEN SOURCE: NORMAL
SPECIMEN SOURCE: NORMAL

## 2017-03-28 DIAGNOSIS — R05.9 COUGH: Primary | ICD-10-CM

## 2017-03-28 NOTE — PROGRESS NOTES
Addendeum to the bronchoscopy note of 3/924850.  Total sedation time was 16 minutes.      Cesario nelson

## 2017-03-30 ENCOUNTER — OFFICE VISIT (OUTPATIENT)
Dept: OTOLARYNGOLOGY | Facility: CLINIC | Age: 72
End: 2017-03-30

## 2017-03-30 DIAGNOSIS — R05.3 CHRONIC COUGH: Primary | ICD-10-CM

## 2017-03-30 DIAGNOSIS — R49.0 DYSPHONIA: ICD-10-CM

## 2017-03-30 NOTE — PROGRESS NOTES
"[facility] VOICE CLINIC  Yousuf Fung Jr., M.D., F.A.C.S.  Judy Villanueva M.D., M.P.H.  Irma Ramirez, Ph.D., CCC/SLP  Sheri Medina M.M. (voice), M.KRISTEN., CCC/SLP  Ehsan Nova M.M. (voice), M.KRISTEN., [facility]/SLP    [facility] VOICE CLINIC  INITIAL EVALUATION AND LARYNGEAL EXAMINATION REPORT    Patient: Davida Reynaga  Date of Visit: 3/30/2017    CHIEF COMPLAINT: Chronic cough & Dysphonia    HISTORY  PATIENT INFORMATION  Davida Reynaga was seen for initial voice evaluation, laryngeal examination and treatment today.   She was referred to this clinic by Dr. Antoni Walker.   She was joined today by her partner, Khushi, and plan to visit their grandson who attends the [employer].  They live in [place], [place].    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  Salient details of her history are as follows:    Chronic cough for 20 years; gradually worsening with no inciting event.    Coughing does not occur at night; increases with anxiety.  Has \"good and bad\" days.    Has undergone several different types of medical treatments with no lasting benefit to bring her cough within normal limits.    Currently, she finds sipping water and using Halls cough drops (used sparingly) to be most beneficial.    History of seasonal allergies.    CURRENT SYMPTOMS INCLUDE:  VOICE    Has not typically associated her speaking voice with her cough, but has more recently begun to question a connection.    Works as a psychologist, and finds that there is a fair amount of talking, but also listening.    She has a passion for singing; sang in independently and with choirs most of her life,  but had to leave her choir 10 years ago, because her cough became so severe and disruptive.    Typically sang alto/ mezzo-soprano, but would currently sing tenor.    Does not experience vocal fatigue from " "speaking, but does with singing    Can tolerate singing familiar tunes with a group (ex: Happy Birthday) but experiences fatigue and laryngeal tension with extended singing, or singing with pitch extremes.  COUGH/AIRWAY    Has worked with a Pulmonologist for several years - tests are negative for asthma.  Was diagnosed with cough variant asthma once, but does not believe this to be accurate.      Has tried asthma, allergy, and neuromodulating medications with limited benefit.    A pul[facility]ologist [facility] the [facility] once told her that she had a \"habituated cough\".    Cough does occasionally change from productive to unproductive    She did have a mild URI 3-4 weeks ago, which she believes is still resolving.    Occasionally, noisy breath on the inhale.  Feels a bit like a rattle.    her cough/ throat clearing triggers include:         Voice use, reflux and increased anxiety  SWALLOWING    Intermittent perceived effort of swallowing.    Modified barium swallow studied done 2 years ago; swallow function was within normal limits.  ADDITIONAL    Had throat \"polyps\" removed by [facility] in the past; she is certain that they were not on the vocal folds.    Reports that reflux is currently well managed; Prilosec and Rantidine at night.    OTHER PERTINENT HISTORY    Reflux; managed with PPI    Allergies; managed with medications; seasonal/ mold    No confirmed Dx of asthma; used a lidocaine nebulizer in the past with limited benefit to her cough.    Anxiety/depression; managed with medications - Clonazepam 1x/mo and long Hx of Prozac    Denies sinus problems    Uncertain about post nasal drainage; it was once suspected as a cause of her cough    Never smoker, but was exposed to second hand smoke growing up.    Hip transplant in Summer 2006; second hip transplant will occur in April 2017    OBJECTIVE FINDINGS  Patient Supplied Answers To [facility] Intake Voice Questionnaire  [facility] Intake - Voice Review 3/20/2017   How long have you had " this voice problem? CHRONIC COUGH - 20+ YRS.   What do you think caused this voice problem? COMBINATION OF ALLERGIES, ACID REFLUX, ASTHMA   How quickly did the voice problem develop? Gradually   For your voice problem, how do the symptoms vary? Worse after heavy voice use, Worse with stress, Most of the time   Over time, how has the voice problem changed? Worse   How would you rate your typical vocal demand? Routine: frequent periods of talking on most days; most talking is conversational speech   Please list activities that involve your voice (such as singing, coaching, etc.): SINGING, PROVIDING PSYCHOTHERAPY   Effort for speaking 2   Effort for singing 7   Overall vocal quality 4   Is your voice ever normal, even briefly? Yes   What health care professionals have you seen for this voice problem?  Who and when? ALLERGIST(S) (SALMA COULTER), FAMILY PRACTITIONERS (YOGESH CARDOSO, DANIEL); PULMONOLOGISTS (DANIS); GASTROENTEROLOGIST (ANAHI);     For your voice problem, what testing/studies have you done (such as imaging/reflux testing)? BRONCHOSCOPY; REFLUX; METHACHOLINE CHALLENGE; CHEST XRAYS; PULMONARY FUNCTION, ALLERGY TESTING   Did you receive any therapy or treatment for your voice problem?  Please describe briefly. MEDICAL TREATMENT FOR ALLERGIES, ASTHMA, REFLUX. NO THERAPEUTIC TREATMENTS   Prior to this episode, have you ever had a voice problem before?  If yes, at that time did you have therapy or treatment for the voice problem?  Please provide a brief description of that treatment. NO PRIOR PROBLEMS   For your voice problem, is there anything else you'd like to tell us? IT'S TAKEN AWAY AN IMPORTANT PART OF MY LIFE (SINGING)          Patient Supplied Answers To Last 2 VHI Questionnaires  Voice Handicap Index (VHI-10) 3/20/2017   How often do you have any of the following symptoms:  Indigestion, heartburn, chest pain, stomach acid coming up, and/or tasting acid in your mouth or throat?  "Monthly   (F1) My voice makes it difficult for people to hear me. Almost never   (F2) People have difficulty understanding me in a noisy room. Almost never   (F8) My voice difficulties restrict my personal and social life. Sometimes   (F9) I feel left out of conversations because of my voice. Almost never   (F10) My voice problem causes me to lose income. Almost never   (P5) I feel as though I have to strain to produce voice. Sometimes   (P6) The clarity of my voice is unpredictable. Sometimes   (E4) My voice problem upsets me. Always   (E6) My voice makes me feel handicapped. Sometimes   (P3) People ask, \"What's wrong with your voice?\" Almost never   VHI Total Score 17        Patient Supplied Answers To CSI Questionnaire  No flowsheet data found.     Patient Supplied Answers To EAT Questionnaire  No flowsheet data found.    What percentage of your cough is controlled?      0-10%  11-20%  21-30%  31-40%  41-50%   51-60%  61-70%   71-80%  81-90%  %; strategies control up to 80% (lozenges), water is immediate %  Intentional calm self over time after leaving the room it will help.    Overall within the past week, where would you rate the severity of your cough (0-1= normal, 10 is most severe)    1 -  2  -  3  -  4 -  5 -  6  - 7  -  8  -  9  -  10    Cough Severity Index  Please fill out this questionnaire if you experience cough as a symptom.  Please give response that indicates how frequently you have these experiences.  0 = never 1 = almost never 2 = sometimes 3 = almost always 4 = always    1. My cough is worse when I lie down. 1  2. My coughing problem causes me to restrict my persona;l and social life. 1  3. I tend to avoid places because of my cough problem. 0; mindful when in a movie theater, concert, live performance  4. I feel embarrassed because of my coughing problem. 2  5. People ask,   What s wrong?   because I cough a lot. 3  6. I run out of air when I cough. 2  7. My coughing problem affects " my voice. 3  8. My coughing problem limits my physical activity. 0  9. My coughing problem upsets me. 2.5  10. People ask me if I am sick because I cough a lot. 3  *Occasionally, noisy breath on the inhale.  Feels a bit like a rattle.  17.5/40    VOICE AND SPEECH EVALUATION  An evaluation of the voice, speech and breathing was accomplished and audio recorded today; salient features are as follows:    Palpation of the laryngeal area: firm musculature, tenderness of the thyrohyoid area and reduced thyrohyoid space    Breathing pattern: appears within normal limits and adequate , clavicular muscle use pattern , shoulder and neck involvement and phonation is not coordinated with respiration    Tension is evident: upper body and neck and shoulders    VOICE:    Mild breathiness    Mild to moderate roughness    No perceptible strain with conversational speech; mild to moderate strain in upper range.    Habitual pitch is Eb3; low    Intensity:     Conversational speech - informally judged to be WNL for the setting    Projected speech - WNL    Sustained phonation:  Comfortable pitch /a/: Gb3 mild to moderate roughness and breathiness  High pitch /a/: Bb4 moderately strained, breathy and rough  Low pitch /a/: Bb2 clearer; back in focus  Comfortable pitch /i/: A3 moderately rough; mucus sound present    Pitch Glide task    Low pitch -A2    High pitch -  Db5; strained and tight    Singing vs. Speech - the same effort    She states today is a typical voice day    She rates her effort as 4-5 out of 10 (10 is maximum effort) for speech; 8-9 out of 10 for singing    She rates overall voice quality as 4  out of 10 (10 being worst)    GLOBAL ASSESSMENT OF DYSPHONIA:  41/100    CAPE-V Overall Severity:  35/100    COUGH/AIRWAY:    Frequent    Increased in conjunction with talking    Dry    Locus of cough/ throat clear: sounds consistent with upper airway     Severity: moderate to servere/ marked    Percent of time symptoms are self  controlled: 60% per clinician observation    LARYNGEAL EXAMINATION  Sheri Medina M.M. (voice), JACKELINEA., CCC/SLP accomplished the endoscopic laryngeal examination today.  I provided technical support, and provided the protocol of instructions for the patient.  Verbal consent was obtained and witnessed prior to this procedure.   A time-out was performed, verifying patient, procedure, and site.   Type of exam:   Procedure: Flexible endoscopy with chip-tip technology with stroboscopy, left nostril; topical anesthesia with 3% Lidocaine and 0.25% phenylephrine was applied.   This exam shows:    Essentially healthy laryngeal mucosa    Signs of reflux: no remarkable signs of reflux    Secretions:  mild to moderate presence of thickened secretions on the vocal folds and throughout the laryngeal area    Vocal fold mucosa:  mildly and diffusely edematous  and within normal limits, no visible lesions    Vocal fold function: Vocal folds are mobile and meet at midline    Movement is brisk and symmetric    Exam is neurologically normal     Airway is adequate    elongation of the vocal folds for pitch increase is normal    fatigue is evident during a task of 20 quickly repeated vowels; poor coordination between breath flow and phonation    Glottic adduction: on phonation glottic closure is often complete; however, intermittent spindle shape was observed, and on phonation glottic closure is mildly pressed    Moderate to severe four-way constriction of the supraglottic larynx during connected speech    Therapy probes show reduction of hyperfunction with glides and resonant phonemes    The addition of stroboscopy provided the following information (mild difficulty sustaining a straight tone):  o Secretions frequently collect on the left vocal fold, but no remarkable pattern and clear with a gentle cough.  o Amplitude: WNL  o Mucosal Wave: WNL  o Glottic closure:  Subtly irregular; broad based swelling along the posterior 2/3rds of the  vibratory margin of the vocal folds  o Symmetry:  Intermittent asymmetry  o Periodicity: intermittently irregular      Fully abducted      Phonation during stroboscopy demonstrating fullness along the posterior of the vibratory margins, also mild phase asymmetry.    I reviewed this laryngeal exam with Ms. Reynaga today, and I provided pertinent explanations, as well as written and oral information.    Based on the results of the laryngeal exam, Ms. Reynaga demonstrates a relatively healthy larynx with no visible lesions.  However, the vocal folds demonstrate edema along the vibratory margins that is often consistent with frequent coughing, and she also demonstrates moderate to severe hyperfunction during connected speech.    THERAPEUTIC ACTIVITIES  Today Ms. Reynaga participated in the following therapeutic activities:    Asked many questions about the nature of her symptoms, and I answered all of these thoroughly.  Instructed concepts and techniques for optimal vocal hygiene including:    Systemic hydration, including strategies for increasing daily water intake    Topical hydration - Gargling, saline nasal irrigation, humidification, steam    Awareness and reduction of phonotraumatic behaviors    Moderating voice use    Substituting non-voice alternative behaviors    Avoiding cough and throat clearing  Chronic cough / throat clearing reduction therapy    Suppression and substitution strategies were instructed including    Swallowing substitution techniques    Breathing suppression techniques to reduce laryngeal tension    Low impact glottic coup and soft cough    Techniques to raise awareness of habitual throat clearing    Additionally she was instructed to keep a log of what circumstances are eliciting cough / throat clear  Exercises to promote optimal respiratory mechanics    I provided explanation of the anatomy and physiology of respiration for speech and singing; she found this to be helpful    she demonstrated  "excessive upper thoracic engagement during inhalation    Demonstrated difficulty allowing abdominal relaxation for inhalation    Practiced in a prone and supine position on the massage table, with tactile cue of a hand on the low rib-cage to facilitate awareness of low respiratory engagement    With clinician support, patient was able to demonstrate improved abdominal relaxation and engagement on inhalation    Optimal exhalation using inward engagement of the abdominal wall with no corresponding collapse of the upper chest cavity was trained using the pulsed \"sh\" task    A plan for when and how to implement these strategies was developed, and the patient was encouraged to practice the techniques independent of distress two times daily to habituate their use.  Semi-Occluded Vocal Tract (SOVT) exercises instructed to reduce laryngeal tension, promote vocal fold pliability, and coordinate respiration and phonation    Straw with water resistance was found to be most facilitating     Sustained phonation, and voice vs. voiceless productions used to promote easy voicing and raise awareness of laryngeal tension    Ascending and descending glides utilized to promote vocal fold pliability    Instructed to use these exercises as a warm-up / cooldown, and to re-calibrate the voice throughout the day.    Concepts of an optimal regimen for practice were instructed.    She should use an interval schedule of practice, with brief periods of practice frequently throughout each day    Kouts concepts of volitional practice to facilitate motor learning.    I provided an audio recording and handouts of today's therapeutic activities to facilitate practice.    IMPRESSIONS/ RECOMMENDATIONS/ PLAN  IMPRESSIONS / RECOMMENDATIONS  Based on today's evaluation and laryngeal examination, it appears that:    Based on the results of the laryngeal exam, Ms. Reynaga demonstrates a relatively healthy larynx with no visible lesions.  However, the " vocal folds demonstrate edema along the vibratory margins that is often consistent with frequent coughing, and she also demonstrates moderate to severe hyperfunction during connected speech.    Dysphonia/discomfort is accounted for by the hyperfunction and imbalanced function of the intrinsic and extrinsic laryngeal musculature      Cough/throat clear are accounted for by the hypersensitivity of the larynx and pharynx as evidenced by case history, patient complaints and absence of other organic findings; hypersensitivity is compounded by imbalance in the function of the intrinsic and extrinsic laryngeal musculature during connected speech    A course of speech therapy is recommended to optimize vocal technique, improve voice quality, promote reduced discomfort, effort and fatigue and help reduce chronic cough, throat clear and mucosal irritation.    She is entirely amenable to this plan    We began therapy today, working on strategies to improve vocal health and technique    TREATMENT PLAN  Speech therapy    DURATION/FREQUENCY OF TREATMENT  Two weekly and four bi-weekly, one-hour sessions, with two monthly one-hour follow-up sessions    PROGNOSIS  Good prognosis for voice improvement with speech therapy and regular practice of therapeutic activities.    BARRIERS TO LEARNING/TEACHING AND LEARNING NEEDS  None/Unremarkable    GOALS  Patient goal:   To improve and maintain a healthy voice quality  To understand the problem and fix it as much as possible  To reduce her cough to acceptable levels    Short-term goal(s): Within the first 4 sessions, Ms. Reynaga:  1. will demonstrate improved awareness of throat clearing / cough: acknowledging >75% of all cough events during session time with no clinician support  2. will be able to demonstrate provided cough suppression and substitution strategies from memory independently with 90% accuracy  3. will demonstrate semi-occluded vocal tract (SOVT) exercises with at least 80%  accuracy with no clinician support    Long-term goal(s): In 6 months, Ms. Reynaga will:  Report a week of typical vocal activities, in which dysphonia and discomfort do not exceed a level of 3 out of 10, 80% of the time   Report a week with no more than 3 episodes of coughing, that do not last more than 5 seconds  Report a speaking and singing voice quality that is acceptable to her, 90%of the time     G-Codes:   - Functional limitation, current status, at evalution CJ - At least 20 percent but less than 40 percent impaired, limited, or restricted   - Functional limitation, projected goal status, at reporting interval CI - At least 1 percent but less than 20 percent impaired, limited, or restricted    PRIMARY ICD-10 code:  R05 (Chronic Cough)  SECONDARY ICD-10 code:  R49.0 (Dysphonia)     TOTAL SERVICE TIME: 150 minutes  EVALUATION OF VOICE AND RESONANCE: (15307): 60 minutes    TREATMENT (28082): 60 minutes  ENDOSCOPIC LARYNGEAL EXAMINATION WITH STROBOSCOPY (33660): 30 minutes  NO CHARGE FACILITY FEE (87691)    Sheri Medina M.M. (voice), M.A., CCC/SLP  Speech-Language Pathologist  [facility]  [phone]

## 2017-03-30 NOTE — PROGRESS NOTES
"After Visit Summary    Patient: Davida Reynaga  Date of Visit: 3/30/2017    Hygiene:     Systemic Hydration: internal hydration of the entire body  o Sip throughout the day      Topical Hydration: hydration for the surface mucosa of the larynx and vocal folds.  o CPAP - Use the humidifier every night at highest setting.  o Humidifier, especially at night.  o Summer - if the windows are closed and the air is on.    Nasal Irrigation/Nasal Spray  o Please follow instructions as learned today and provided on handout      Gargling    Please follow instructions as learned today and provided on handout    Cough/ Throat Clearing/ Mucus sensation in the throat:     Sip of water     Gargle  o With a voice  o Tilt your head side to side  o [place] chirp -  kakakaaa kakakaaa     Dry swallow try to reduce the sensation.    Hum/ lip trill + dry hard swallow    Breathe in through rounded lips + out with a repeated  sh   + dry hard swallow    Soft throat clear \"eh, eh, eh\"  + dry hard swallow (not too much breath flow \"hairball\")    Suck on a lozenge with Pectin (avoid mint or menthol) or a sugar-free candy, gum, \"wet\" snacks (apples, pineapple, grapes, etc) - Discussed Tic-Tacs, broken smaller pieces of life savers.     (Not helpful today - too hypersensitive) Puppy Sniffs - 2-3 small quick sniffs through the nose and exhale with  sh     Wait \"urge surf\"    Breathing:    In the morning and evening (twice daily) for 2-5 minutes:   o Breathe while lying on your back with your face and knees up. Hands on tummy and chest.  Take a breath in with rounded lips and exhale with a  Shhhhh    o Inhale  = Inflate; exhale = deflate  o 3x each: try breathin in/8 out, 5/10  o Throughout the day (2-3x/day for just a couple minutes) check breathing while keeping shoulders relaxed (riding to and from school, etc.)    Breathing Tips:  o Breathe in through rounded lips and out with a  Shhhh   o Keep shoulders down    Voice:    Bubbles (straw " in 1.5 to 2  of water) 4x/day 1-2min:  o blow 10-15 seconds with no voice and keep bubbles consistent.  o 3x: blow bubbles and add a sustained  who  or an  oo  (E3 - comfortable pitch )  o 3x: blow bubbles and vary  who  gliding up and down             Up and down like a sine wave  o 3x: blow bubbles on a sustained/ varied pitch soft to loud to soft (tani burgess)    These exercises are great for:    *warm up / cool down - Part of the morning routing and before and after extended voice use.    *tissue mobilization exercise - Improving the condition and pliability of the vocal folds.    *Abdominal breathing and applying optimal breath flow to speech/singing.     [place] passage 1-2x/day  - practice rounded lip/ sipping breath  -Breathe before all speech    Sheri Meidna M.M. (voice), M.A., CCC/SLP  Speech-Language Pathologist  [facility]  [phone]    [facility]  [phone]

## 2017-03-30 NOTE — LETTER
"3/30/2017       RE: Davida Reynaga  [address]  [facility] [address]     Dear Colleague,    Thank you for referring your patient, Davida Reynaga, to the [facility] at [facility]. Please see a copy of my visit note below.    After Visit Summary    Patient: Davida Reynaga  Date of Visit: 3/30/2017    Hygiene:     Systemic Hydration: internal hydration of the entire body  o Sip throughout the day      Topical Hydration: hydration for the surface mucosa of the larynx and vocal folds.  o CPAP - Use the humidifier every night at highest setting.  o Humidifier, especially at night.  o Summer - if the windows are closed and the air is on.    Nasal Irrigation/Nasal Spray  o Please follow instructions as learned today and provided on handout      Gargling    Please follow instructions as learned today and provided on handout    Cough/ Throat Clearing/ Mucus sensation in the throat:     Sip of water     Gargle  o With a voice  o Tilt your head side to side  o [place] chirp -  kakakaaa kakakaaa     Dry swallow try to reduce the sensation.    Hum/ lip trill + dry hard swallow    Breathe in through rounded lips + out with a repeated  sh   + dry hard swallow    Soft throat clear \"eh, eh, eh\"  + dry hard swallow (not too much breath flow \"hairball\")    Suck on a lozenge with Pectin (avoid mint or menthol) or a sugar-free candy, gum, \"wet\" snacks (apples, pineapple, grapes, etc) - Discussed Tic-Tacs, broken smaller pieces of life savers.     (Not helpful today - too hypersensitive) Puppy Sniffs - 2-3 small quick sniffs through the nose and exhale with  sh     Wait \"urge surf\"    Breathing:    In the morning and evening (twice daily) for 2-5 minutes:   o Breathe while lying on your back with your face and knees up. Hands on tummy and chest.  Take a breath in with rounded lips and exhale with a  Shhhhh    o Inhale  = Inflate; exhale = deflate  o 3x each: try breathin in/8 out, " 5/10  o Throughout the day (2-3x/day for just a couple minutes) check breathing while keeping shoulders relaxed (riding to and from school, etc.)    Breathing Tips:  o Breathe in through rounded lips and out with a  Shhhh   o Keep shoulders down    Voice:    Bubbles (straw in 1.5 to 2  of water) 4x/day 1-2min:  o blow 10-15 seconds with no voice and keep bubbles consistent.  o 3x: blow bubbles and add a sustained  who  or an  oo  (E3 - comfortable pitch )  o 3x: blow bubbles and vary  who  gliding up and down             Up and down like a sine wave  o 3x: blow bubbles on a sustained/ varied pitch soft to loud to soft (messa di voce)    These exercises are great for:    *warm up / cool down - Part of the morning routing and before and after extended voice use.    *tissue mobilization exercise - Improving the condition and pliability of the vocal folds.    *Abdominal breathing and applying optimal breath flow to speech/singing.     [place] passage 1-2x/day  - practice rounded lip/ sipping breath  -Breathe before all speech    Sheri Medina M.M. (voice), M.A., CCC/SLP  Speech-Language Pathologist  [facility]  [phone]    [facility] [phone]                [facility]  Yousuf Fung Jr., M.D., F.A.C.S.  Judy Villanueva M.D., M.P.H.  Irma Ramirez, Ph.D., CCC/SLP  Sheri Medina M.M. (voice), M.A., CCC/SLP  Ehsan Nova M.M. (voice), MRichardA., CCC/SLP    [facility]  INITIAL EVALUATION AND LARYNGEAL EXAMINATION REPORT    Patient: Davida Reynaga  Date of Visit: 3/30/2017    CHIEF COMPLAINT: Chronic cough & Dysphonia    HISTORY  PATIENT INFORMATION  Davida Reynaga was seen for initial voice evaluation, laryngeal examination and treatment today.   She was referred to this clinic by Dr. Antoni Walker.   She was joined today by her partner, Khushi, and plan to visit their grandson who attends the [employer].  They live in [place], [place].    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  Salient details of her history are as follows:    Chronic cough for 20 years; gradually worsening with no inciting event.    Coughing does not occur at night; increases with anxiety.  Has \"good and bad\" days.    Has undergone several different types of medical treatments with no lasting benefit to bring her cough within normal limits.    Currently, she finds sipping water and using [place] cough drops (used sparingly) to be most beneficial.    History of seasonal allergies.    CURRENT SYMPTOMS INCLUDE:  VOICE    Has not typically associated her speaking voice with her cough, but has more recently begun to question a connection.    Works as a psychologist, and finds that there is a fair amount of talking, but also listening.    She has a passion for singing; sang in independently and with choirs most of her life,  but had to leave her choir 10 years ago, because her cough became so severe and disruptive.    Typically sang alto/ mezzo-soprano, but would currently sing tenor.    Does not experience vocal fatigue from speaking, but does with singing    Can tolerate singing familiar tunes with a group (ex: Happy Birthday) but experiences fatigue and laryngeal tension with extended singing, or singing with pitch extremes.  COUGH/AIRWAY    Has worked with a Pulmonologist for several years - tests are negative for asthma.  Was diagnosed with cough variant asthma once, but does not believe this to be accurate.      Has tried asthma, allergy, and neuromodulating medications with limited benefit.    A pulmonologist at the [facility] once told her that she had a \"habituated cough\".    Cough does occasionally change from productive to unproductive    She did have a mild URI 3-4 weeks ago, which she believes is still resolving.    Occasionally, noisy breath " "on the inhale.  Feels a bit like a rattle.    her cough/ throat clearing triggers include:         Voice use, reflux and increased anxiety  SWALLOWING    Intermittent perceived effort of swallowing.    Modified barium swallow studied done 2 years ago; swallow function was within normal limits.  ADDITIONAL    Had throat \"polyps\" removed by [facility] in the past; she is certain that they were not on the vocal folds.    Reports that reflux is currently well managed; Prilosec and Rantidine at night.    OTHER PERTINENT HISTORY    Reflux; managed with PPI    Allergies; managed with medications; seasonal/ mold    No confirmed Dx of asthma; used a lidocaine nebulizer in the past with limited benefit to her cough.    Anxiety/depression; managed with medications - Clonazepam 1x/mo and long Hx of Prozac    Denies sinus problems    Uncertain about post nasal drainage; it was once suspected as a cause of her cough    Never smoker, but was exposed to second hand smoke growing up.    Hip transplant in Summer 2006; second hip transplant will occur in April 2017    OBJECTIVE FINDINGS  Patient Supplied Answers To [facility] Intake Voice Questionnaire  [facility] Intake - Voice Review 3/20/2017   How long have you had this voice problem? CHRONIC COUGH - 20+ YRS.   What do you think caused this voice problem? COMBINATION OF ALLERGIES, ACID REFLUX, ASTHMA   How quickly did the voice problem develop? Gradually   For your voice problem, how do the symptoms vary? Worse after heavy voice use, Worse with stress, Most of the time   Over time, how has the voice problem changed? Worse   How would you rate your typical vocal demand? Routine: frequent periods of talking on most days; most talking is conversational speech   Please list activities that involve your voice (such as singing, coaching, etc.): SINGING, PROVIDING PSYCHOTHERAPY   Effort for speaking 2   Effort for singing 7   Overall vocal quality 4   Is your voice ever normal, even briefly? Yes   What " "health care professionals have you seen for this voice problem?  Who and when? ALLERGIST(S) (SALMA COULTER), FAMILY PRACTITIONERS (YOGESH CARDOSO, DANIEL); PULMONOLOGISTS (DANIS); GASTROENTEROLOGIST (ANAHI);     For your voice problem, what testing/studies have you done (such as imaging/reflux testing)? BRONCHOSCOPY; REFLUX; METHACHOLINE CHALLENGE; CHEST XRAYS; PULMONARY FUNCTION, ALLERGY TESTING   Did you receive any therapy or treatment for your voice problem?  Please describe briefly. MEDICAL TREATMENT FOR ALLERGIES, ASTHMA, REFLUX. NO THERAPEUTIC TREATMENTS   Prior to this episode, have you ever had a voice problem before?  If yes, at that time did you have therapy or treatment for the voice problem?  Please provide a brief description of that treatment. NO PRIOR PROBLEMS   For your voice problem, is there anything else you'd like to tell us? IT'S TAKEN AWAY AN IMPORTANT PART OF MY LIFE (SINGING)          Patient Supplied Answers To Last 2 VHI Questionnaires  Voice Handicap Index (VHI-10) 3/20/2017   How often do you have any of the following symptoms:  Indigestion, heartburn, chest pain, stomach acid coming up, and/or tasting acid in your mouth or throat? Monthly   (F1) My voice makes it difficult for people to hear me. Almost never   (F2) People have difficulty understanding me in a noisy room. Almost never   (F8) My voice difficulties restrict my personal and social life. Sometimes   (F9) I feel left out of conversations because of my voice. Almost never   (F10) My voice problem causes me to lose income. Almost never   (P5) I feel as though I have to strain to produce voice. Sometimes   (P6) The clarity of my voice is unpredictable. Sometimes   (E4) My voice problem upsets me. Always   (E6) My voice makes me feel handicapped. Sometimes   (P3) People ask, \"What's wrong with your voice?\" Almost never   VHI Total Score 17        Patient Supplied Answers To CSI Questionnaire  No flowsheet data " found.     Patient Supplied Answers To EAT Questionnaire  No flowsheet data found.    What percentage of your cough is controlled?      0-10%  11-20%  21-30%  31-40%  41-50%   51-60%  61-70%   71-80%  81-90%  %; strategies control up to 80% (lozenges), water is immediate %  Intentional calm self over time after leaving the room it will help.    Overall within the past week, where would you rate the severity of your cough (0-1= normal, 10 is most severe)    1 -  2  -  3  -  4 -  5 -  6  - 7  -  8  -  9  -  10    Cough Severity Index  Please fill out this questionnaire if you experience cough as a symptom.  Please give response that indicates how frequently you have these experiences.  0 = never 1 = almost never 2 = sometimes 3 = almost always 4 = always    1. My cough is worse when I lie down. 1  2. My coughing problem causes me to restrict my persona;l and social life. 1  3. I tend to avoid places because of my cough problem. 0; mindful when in a movie theater, concert, live performance  4. I feel embarrassed because of my coughing problem. 2  5. People ask,   What s wrong?   because I cough a lot. 3  6. I run out of air when I cough. 2  7. My coughing problem affects my voice. 3  8. My coughing problem limits my physical activity. 0  9. My coughing problem upsets me. 2.5  10. People ask me if I am sick because I cough a lot. 3  *Occasionally, noisy breath on the inhale.  Feels a bit like a rattle.  17.5/40    VOICE AND SPEECH EVALUATION  An evaluation of the voice, speech and breathing was accomplished and audio recorded today; salient features are as follows:    Palpation of the laryngeal area: firm musculature, tenderness of the thyrohyoid area and reduced thyrohyoid space    Breathing pattern: appears within normal limits and adequate , clavicular muscle use pattern , shoulder and neck involvement and phonation is not coordinated with respiration    Tension is evident: upper body and neck and  shoulders    VOICE:    Mild breathiness    Mild to moderate roughness    No perceptible strain with conversational speech; mild to moderate strain in upper range.    Habitual pitch is Eb3; low    Intensity:     Conversational speech - informally judged to be WNL for the setting    Projected speech - WNL    Sustained phonation:  Comfortable pitch /a/: Gb3 mild to moderate roughness and breathiness  High pitch /a/: Bb4 moderately strained, breathy and rough  Low pitch /a/: Bb2 clearer; back in focus  Comfortable pitch /i/: A3 moderately rough; mucus sound present    Pitch Glide task    Low pitch -A2    High pitch -  Db5; strained and tight    Singing vs. Speech - the same effort    She states today is a typical voice day    She rates her effort as 4-5 out of 10 (10 is maximum effort) for speech; 8-9 out of 10 for singing    She rates overall voice quality as 4  out of 10 (10 being worst)    GLOBAL ASSESSMENT OF DYSPHONIA:  41/100    CAPE-V Overall Severity:  35/100    COUGH/AIRWAY:    Frequent    Increased in conjunction with talking    Dry    Locus of cough/ throat clear: sounds consistent with upper airway     Severity: moderate to servere/ marked    Percent of time symptoms are self controlled: 60% per clinician observation    LARYNGEAL EXAMINATION  Sheri Medina M.M. (voice), M.A., CCC/SLP accomplished the endoscopic laryngeal examination today.  I provided technical support, and provided the protocol of instructions for the patient.  Verbal consent was obtained and witnessed prior to this procedure.   A time-out was performed, verifying patient, procedure, and site.   Type of exam:   Procedure: Flexible endoscopy with chip-tip technology with stroboscopy, left nostril; topical anesthesia with 3% Lidocaine and 0.25% phenylephrine was applied.   This exam shows:    Essentially healthy laryngeal mucosa    Signs of reflux: no remarkable signs of reflux    Secretions:  mild to moderate presence of thickened secretions  on the vocal folds and throughout the laryngeal area    Vocal fold mucosa:  mildly and diffusely edematous  and within normal limits, no visible lesions    Vocal fold function: Vocal folds are mobile and meet at midline    Movement is brisk and symmetric    Exam is neurologically normal     Airway is adequate    elongation of the vocal folds for pitch increase is normal    fatigue is evident during a task of 20 quickly repeated vowels; poor coordination between breath flow and phonation    Glottic adduction: on phonation glottic closure is often complete; however, intermittent spindle shape was observed, and on phonation glottic closure is mildly pressed    Moderate to severe four-way constriction of the supraglottic larynx during connected speech    Therapy probes show reduction of hyperfunction with glides and resonant phonemes    The addition of stroboscopy provided the following information (mild difficulty sustaining a straight tone):  o Secretions frequently collect on the left vocal fold, but no remarkable pattern and clear with a gentle cough.  o Amplitude: WNL  o Mucosal Wave: WNL  o Glottic closure:  Subtly irregular; broad based swelling along the posterior 2/3rds of the vibratory margin of the vocal folds  o Symmetry:  Intermittent asymmetry  o Periodicity: intermittently irregular      Fully abducted      Phonation during stroboscopy demonstrating fullness along the posterior of the vibratory margins, also mild phase asymmetry.    I reviewed this laryngeal exam with Ms. Reynaga today, and I provided pertinent explanations, as well as written and oral information.    Based on the results of the laryngeal exam, Ms. Reynaga demonstrates a relatively healthy larynx with no visible lesions.  However, the vocal folds demonstrate edema along the vibratory margins that is often consistent with frequent coughing, and she also demonstrates moderate to severe hyperfunction during connected speech.    THERAPEUTIC  "ACTIVITIES  Today Ms. Reynaga participated in the following therapeutic activities:    Asked many questions about the nature of her symptoms, and I answered all of these thoroughly.  Instructed concepts and techniques for optimal vocal hygiene including:    Systemic hydration, including strategies for increasing daily water intake    Topical hydration - Gargling, saline nasal irrigation, humidification, steam    Awareness and reduction of phonotraumatic behaviors    Moderating voice use    Substituting non-voice alternative behaviors    Avoiding cough and throat clearing  Chronic cough / throat clearing reduction therapy    Suppression and substitution strategies were instructed including    Swallowing substitution techniques    Breathing suppression techniques to reduce laryngeal tension    Low impact glottic coup and soft cough    Techniques to raise awareness of habitual throat clearing    Additionally she was instructed to keep a log of what circumstances are eliciting cough / throat clear  Exercises to promote optimal respiratory mechanics    I provided explanation of the anatomy and physiology of respiration for speech and singing; she found this to be helpful    she demonstrated excessive upper thoracic engagement during inhalation    Demonstrated difficulty allowing abdominal relaxation for inhalation    Practiced in a prone and supine position on the massage table, with tactile cue of a hand on the low rib-cage to facilitate awareness of low respiratory engagement    With clinician support, patient was able to demonstrate improved abdominal relaxation and engagement on inhalation    Optimal exhalation using inward engagement of the abdominal wall with no corresponding collapse of the upper chest cavity was trained using the pulsed \"sh\" task    A plan for when and how to implement these strategies was developed, and the patient was encouraged to practice the techniques independent of distress two times daily " to habituate their use.  Semi-Occluded Vocal Tract (SOVT) exercises instructed to reduce laryngeal tension, promote vocal fold pliability, and coordinate respiration and phonation    Straw with water resistance was found to be most facilitating     Sustained phonation, and voice vs. voiceless productions used to promote easy voicing and raise awareness of laryngeal tension    Ascending and descending glides utilized to promote vocal fold pliability    Instructed to use these exercises as a warm-up / cooldown, and to re-calibrate the voice throughout the day.    Concepts of an optimal regimen for practice were instructed.    She should use an interval schedule of practice, with brief periods of practice frequently throughout each day    [place] concepts of volitional practice to facilitate motor learning.    I provided an audio recording and handouts of today's therapeutic activities to facilitate practice.    IMPRESSIONS/ RECOMMENDATIONS/ PLAN  IMPRESSIONS / RECOMMENDATIONS  Based on today's evaluation and laryngeal examination, it appears that:    Based on the results of the laryngeal exam, Ms. Reynaga demonstrates a relatively healthy larynx with no visible lesions.  However, the vocal folds demonstrate edema along the vibratory margins that is often consistent with frequent coughing, and she also demonstrates moderate to severe hyperfunction during connected speech.    Dysphonia/discomfort is accounted for by the hyperfunction and imbalanced function of the intrinsic and extrinsic laryngeal musculature      Cough/throat clear are accounted for by the hypersensitivity of the larynx and pharynx as evidenced by case history, patient complaints and absence of other organic findings; hypersensitivity is compounded by imbalance in the function of the intrinsic and extrinsic laryngeal musculature during connected speech    A course of speech therapy is recommended to optimize vocal technique, improve voice quality,  promote reduced discomfort, effort and fatigue and help reduce chronic cough, throat clear and mucosal irritation.    She is entirely amenable to this plan    We began therapy today, working on strategies to improve vocal health and technique    TREATMENT PLAN  Speech therapy    DURATION/FREQUENCY OF TREATMENT  Two weekly and four bi-weekly, one-hour sessions, with two monthly one-hour follow-up sessions    PROGNOSIS  Good prognosis for voice improvement with speech therapy and regular practice of therapeutic activities.    BARRIERS TO LEARNING/TEACHING AND LEARNING NEEDS  None/Unremarkable    GOALS  Patient goal:   To improve and maintain a healthy voice quality  To understand the problem and fix it as much as possible  To reduce her cough to acceptable levels    Short-term goal(s): Within the first 4 sessions, Ms. Reynaga:  1. will demonstrate improved awareness of throat clearing / cough: acknowledging >75% of all cough events during session time with no clinician support  2. will be able to demonstrate provided cough suppression and substitution strategies from memory independently with 90% accuracy  3. will demonstrate semi-occluded vocal tract (SOVT) exercises with at least 80% accuracy with no clinician support    Long-term goal(s): In 6 months, Ms. Reynaga will:  Report a week of typical vocal activities, in which dysphonia and discomfort do not exceed a level of 3 out of 10, 80% of the time   Report a week with no more than 3 episodes of coughing, that do not last more than 5 seconds  Report a speaking and singing voice quality that is acceptable to her, 90%of the time     G-Codes:   - Functional limitation, current status, at evalution CJ - At least 20 percent but less than 40 percent impaired, limited, or restricted   - Functional limitation, projected goal status, at reporting interval CI - At least 1 percent but less than 20 percent impaired, limited, or restricted    PRIMARY ICD-10 code:  R05  (Chronic Cough)  SECONDARY ICD-10 code:  R49.0 (Dysphonia)     TOTAL SERVICE TIME: 150 minutes  EVALUATION OF VOICE AND RESONANCE: (13533): 60 minutes    TREATMENT (08684): 60 minutes  ENDOSCOPIC LARYNGEAL EXAMINATION WITH STROBOSCOPY (94515): 30 minutes  NO CHARGE FACILITY FEE (73990)    Sheri Medina M.M. (voice), M.A., CCC/SLP  Speech-Language Pathologist  [facility]  [phone]

## 2017-03-30 NOTE — MR AVS SNAPSHOT
After Visit Summary   3/30/2017    Davida Reynaga    MRN: [id number]           Patient Information     Date Of Birth          1945        Visit Information        Provider Department      3/30/2017 10:00 AM Sheri Medina S[facility]        Today's Diagnoses     Chronic cough    -  1    Dysphonia           Follow-ups after your visit        Your next 10 appointments already scheduled     Apr 10, 2017  8:00 AM CDT   (Arrive by 7:45 AM)   RETURN SLP VOICE with [facility] ([facility])    [address] [place] [address] [id number] [phone]            Apr 17, 2017  8:00 AM CDT   (Arrive by 7:45 AM)   RETURN SLP VOICE with [facility] ([facility])    [address] [place] [address] [id number] [phone]            Jun 12, 2017 12:15 PM CDT   (Arrive by 12:00 PM)   New Allergy with Iftikhar Gilliland MD   [facility] ([facility])    [address] [place] [address] [id number] [phone]           Do not take anti-histamines or Zantac for seven day prior to your appointment.              Who to contact     Please call your clinic at [phone] to:    Ask questions about your health    Make or cancel appointments    Discuss your medicines    Learn about your test results    Speak to your doctor   If you have compliments or concerns about an experience at your clinic, or if you wish to file a complaint, please contact [facility] Patient Relations at [phone] or email us at [email or web address]         Additional Information About Your Visit        MyChart Information     [facility] gives you secure access to your electronic health record. If you see a primary care provider, you can also send messages to your care team and make  appointments. If you have questions, please call your primary care clinic.  If you do not have a primary care provider, please call [phone] and they will assist you.      [employer] is an electronic gateway that provides easy, online access to your medical records. With [employer], you can request a clinic appointment, read your test results, renew a prescription or communicate with your care team.     To access your existing account, please contact your [facility] or call [phone] for assistance.        Care EveryWhere ID     This is your Care EveryWhere ID. This could be used by other organizations to access your [place] medical records  [id number]         Blood Pressure from Last 3 Encounters:   03/31/17 (!) 165/105   03/22/17 (!) 132/124   03/03/17 147/85    Weight from Last 3 Encounters:   03/31/17 85.3 kg (188 lb)   03/22/17 85.3 kg (188 lb)              We Performed the Following     C BEHAVIORAL & QUALITATIVE ANALYSIS VOICE AND RESONANCE     HC  SLP VOICE CURRENT STATUS     HC SLP VOICE GOAL STATUS     IMAGESTREAM RECORDING ORDER     LARYNGOSCOPY FLEX/RIGID W STROBOSCOPY     SPEECH/HEARING THERAPY, INDIVIDUAL        Primary Care Provider    None Specified       No primary provider on file.        Thank you!     Thank you for choosing  [facility]  for your care. Our goal is always to provide you with excellent care. Hearing back from our patients is one way we can continue to improve our services. Please take a few minutes to complete the written survey that you may receive in the mail after your visit with us. Thank you!             Your Updated Medication List - Protect others around you: Learn how to safely use, store and throw away your medicines at www.disposemymeds.org.          This list is accurate as of: 3/30/17 11:59 PM.  Always use your most recent med list.                   Brand Name Dispense Instructions for use    albuterol 108 (90 BASE) MCG/ACT Inhaler     PROAIR HFA/PROVENTIL HFA/VENTOLIN HFA     Inhale 2 puffs into the lungs every 6 hours Reported on 3/7/2017       aspirin 81 MG tablet      Take by mouth daily       atorvastatin 40 MG tablet    LIPITOR     Take 40 mg by mouth daily       calcium-vitamin D 600-400 MG-UNIT per tablet    CALTRATE     Take 1 tablet by mouth 2 times daily       cetirizine 10 MG tablet    zyrTEC     Take 10 mg by mouth daily       clonazePAM 0.5 MG tablet    klonoPIN     Take 0.5 mg by mouth 2 times daily as needed for anxiety       guaiFENesin-codeine 100-10 MG/5ML Soln solution    ROBITUSSIN AC     Take 1-2 tsp. by mouth every 4 hours as needed for cough Reported on 3/7/2017       hydrochlorothiazide 12.5 MG capsule    MICROZIDE     Take 12.5 mg by mouth daily       HYDROcodone-acetaminophen 5-325 MG per tablet    NORCO     Take 1 tablet by mouth as needed for moderate to severe pain Reported on 3/7/2017       lactobacillus acidophilus Tabs      Take 1 tablet by mouth 3 times daily (before meals)       losartan 100 MG tablet    COZAAR     Take 100 mg by mouth daily       MELATONIN PO      Take 10 mg by mouth       NASACORT AQ NA          omeprazole 40 MG capsule    priLOSEC     Take by mouth daily       PROZAC 10 MG capsule   Generic drug:  FLUoxetine      Take 50 mg by mouth daily       ranitidine 150 MG tablet    ZANTAC     Take by mouth 2 times daily       Senna 176 MG/5ML Syrp          SUDAFED 12 HOUR 120 MG 12 hr tablet   Generic drug:  pseudoePHEDrine      Take 120 mg by mouth every 12 hours Reported on 3/7/2017       THERAPEUTIC M PO          timolol 0.25 % ophthalmic gel-form    TIMOPTIC-XE     1 drop every morning       traZODone 50 MG tablet    DESYREL     Take by mouth At Bedtime       Vitamin D-3 1000 UNITS Caps      Take 2,000 Units by mouth       WELLBUTRIN  MG 24 hr tablet   Generic drug:  buPROPion      Take 150 mg by mouth every morning

## 2017-03-31 ENCOUNTER — OFFICE VISIT (OUTPATIENT)
Dept: PULMONOLOGY | Facility: CLINIC | Age: 72
End: 2017-03-31
Attending: INTERNAL MEDICINE
Payer: MEDICARE

## 2017-03-31 ENCOUNTER — RADIANT APPOINTMENT (OUTPATIENT)
Dept: CARDIOLOGY | Facility: CLINIC | Age: 72
End: 2017-03-31
Attending: INTERNAL MEDICINE

## 2017-03-31 VITALS
HEART RATE: 93 BPM | BODY MASS INDEX: 33.31 KG/M2 | RESPIRATION RATE: 18 BRPM | WEIGHT: 188 LBS | SYSTOLIC BLOOD PRESSURE: 165 MMHG | TEMPERATURE: 98.2 F | DIASTOLIC BLOOD PRESSURE: 105 MMHG | HEIGHT: 63 IN | OXYGEN SATURATION: 97 %

## 2017-03-31 DIAGNOSIS — R05.8 ALLERGIC COUGH: Primary | ICD-10-CM

## 2017-03-31 DIAGNOSIS — R05.3 CHRONIC COUGH: ICD-10-CM

## 2017-03-31 DIAGNOSIS — J45.30 MILD PERSISTENT ASTHMA WITHOUT COMPLICATION: ICD-10-CM

## 2017-03-31 LAB
BASOPHILS # BLD AUTO: 0 10E9/L (ref 0–0.2)
BASOPHILS NFR BLD AUTO: 0.6 %
DIFFERENTIAL METHOD BLD: NORMAL
EOSINOPHIL # BLD AUTO: 0.2 10E9/L (ref 0–0.7)
EOSINOPHIL NFR BLD AUTO: 3.1 %
ERYTHROCYTE [DISTWIDTH] IN BLOOD BY AUTOMATED COUNT: 11.9 % (ref 10–15)
HCT VFR BLD AUTO: 42.5 % (ref 35–47)
HGB BLD-MCNC: 13.8 G/DL (ref 11.7–15.7)
IMM GRANULOCYTES # BLD: 0 10E9/L (ref 0–0.4)
IMM GRANULOCYTES NFR BLD: 0.4 %
LYMPHOCYTES # BLD AUTO: 1.4 10E9/L (ref 0.8–5.3)
LYMPHOCYTES NFR BLD AUTO: 19.8 %
MCH RBC QN AUTO: 28.2 PG (ref 26.5–33)
MCHC RBC AUTO-ENTMCNC: 32.5 G/DL (ref 31.5–36.5)
MCV RBC AUTO: 87 FL (ref 78–100)
MONOCYTES # BLD AUTO: 0.7 10E9/L (ref 0–1.3)
MONOCYTES NFR BLD AUTO: 9.7 %
NEUTROPHILS # BLD AUTO: 4.7 10E9/L (ref 1.6–8.3)
NEUTROPHILS NFR BLD AUTO: 66.4 %
NRBC # BLD AUTO: 0 10*3/UL
NRBC BLD AUTO-RTO: 0 /100
PLATELET # BLD AUTO: 372 10E9/L (ref 150–450)
PULMONARY FUNCTION TEST-FENO: NORMAL PPB (ref 0–40)
RBC # BLD AUTO: 4.9 10E12/L (ref 3.8–5.2)
WBC # BLD AUTO: 7.1 10E9/L (ref 4–11)

## 2017-03-31 PROCEDURE — 99212 OFFICE O/P EST SF 10 MIN: CPT | Mod: ZF

## 2017-03-31 PROCEDURE — 82785 ASSAY OF IGE: CPT | Performed by: INTERNAL MEDICINE

## 2017-03-31 PROCEDURE — 86003 ALLG SPEC IGE CRUDE XTRC EA: CPT | Performed by: INTERNAL MEDICINE

## 2017-03-31 PROCEDURE — 36415 COLL VENOUS BLD VENIPUNCTURE: CPT | Performed by: INTERNAL MEDICINE

## 2017-03-31 PROCEDURE — 85025 COMPLETE CBC W/AUTO DIFF WBC: CPT | Performed by: INTERNAL MEDICINE

## 2017-03-31 PROCEDURE — 86001 ALLERGEN SPECIFIC IGG: CPT | Performed by: INTERNAL MEDICINE

## 2017-03-31 ASSESSMENT — ENCOUNTER SYMPTOMS
HOARSE VOICE: 0
CHILLS: 0
DYSPNEA ON EXERTION: 1
COUGH: 1
SORE THROAT: 0
SPUTUM PRODUCTION: 1
SINUS PAIN: 0
FEVER: 0
WHEEZING: 0
RESPIRATORY PAIN: 0
SHORTNESS OF BREATH: 1
POLYPHAGIA: 0
WEIGHT LOSS: 0
HEMOPTYSIS: 0
TROUBLE SWALLOWING: 0
FATIGUE: 1
HALLUCINATIONS: 0
SINUS CONGESTION: 1
NIGHT SWEATS: 0
SMELL DISTURBANCE: 0
WEIGHT GAIN: 0
POSTURAL DYSPNEA: 0
POLYDIPSIA: 0
SNORES LOUDLY: 0
INCREASED ENERGY: 1
TASTE DISTURBANCE: 0
COUGH DISTURBING SLEEP: 1
ALTERED TEMPERATURE REGULATION: 0
NECK MASS: 0
DECREASED APPETITE: 0

## 2017-03-31 ASSESSMENT — PAIN SCALES - GENERAL: PAINLEVEL: MILD PAIN (2)

## 2017-03-31 NOTE — LETTER
"3/31/2017       RE: Davida Reynaga  [address]  [facility] [address]     Dear Colleague,    Thank you for referring your patient, Davida Reynaga, to the [facility] at [facility]. Please see a copy of my visit note below.    Pulmonary Clinic Follow-Up Visit Note    CC: \"Chronic cough\"      HPI:   Ms. Reynaga returns to clinic to talk about her bronchoscopy, biopsy, CT and lab results in the workup of her chronic cough.    Please refer to my previous notes, but in brief the cough has been present for > 25 years and was previously effectively treated with \"allergy shots.\" She's not gotten benefit/relief in recent years no oral allergy medications.      No new complaints today.  Cough persists unchanged.      PMH:  Discussed with the patient    Allergies:  Allergies   Allergen Reactions     Ciprofloxacin        Social History:  Social History     Social History     Marital status: Single     Spouse name: N/A     Number of children: N/A     Years of education: N/A     Occupational History     Not on file.     Social History Main Topics     Smoking status: Never Smoker     Smokeless tobacco: Not on file     Alcohol use Not on file     Drug use: Not on file     Sexual activity: Not on file     Other Topics Concern     Not on file     Social History Narrative       Medications:  Current Outpatient Prescriptions   Medication Sig Dispense Refill     aspirin 81 MG tablet Take by mouth daily       atorvastatin (LIPITOR) 40 MG tablet Take 40 mg by mouth daily       clonazePAM (KLONOPIN) 0.5 MG tablet Take 0.5 mg by mouth 2 times daily as needed for anxiety       hydrochlorothiazide (MICROZIDE) 12.5 MG capsule Take 12.5 mg by mouth daily       losartan (COZAAR) 100 MG tablet Take 100 mg by mouth daily       MELATONIN PO Take 10 mg by mouth       omeprazole (PRILOSEC) 40 MG capsule Take by mouth daily       FLUoxetine (PROZAC) 10 MG capsule Take 50 mg by " "mouth daily       ranitidine (ZANTAC) 150 MG tablet Take by mouth 2 times daily       Senna 176 MG/5ML SYRP        pseudoePHEDrine (SUDAFED 12 HOUR) 120 MG 12 hr tablet Take 120 mg by mouth every 12 hours Reported on 3/7/2017       Multiple Vitamins-Minerals (THERAPEUTIC M PO)        timolol (TIMOPTIC-XE) 0.25 % ophthalmic gel-form 1 drop every morning       traZODone (DESYREL) 50 MG tablet Take by mouth At Bedtime       Cholecalciferol (VITAMIN D-3) 1000 UNITS CAPS Take 2,000 Units by mouth       buPROPion (WELLBUTRIN XL) 150 MG 24 hr tablet Take 150 mg by mouth every morning       guaiFENesin-codeine (ROBITUSSIN AC) 100-10 MG/5ML SOLN solution Take 1-2 tsp. by mouth every 4 hours as needed for cough Reported on 3/7/2017       HYDROcodone-acetaminophen (NORCO) 5-325 MG per tablet Take 1 tablet by mouth as needed for moderate to severe pain Reported on 3/7/2017       albuterol (PROAIR HFA/PROVENTIL HFA/VENTOLIN HFA) 108 (90 BASE) MCG/ACT Inhaler Inhale 2 puffs into the lungs every 6 hours Reported on 3/7/2017       cetirizine (ZYRTEC) 10 MG tablet Take 10 mg by mouth daily       Triamcinolone Acetonide (NASACORT AQ NA)        Lactobacillus Acid-Pectin (LACTOBACILLUS ACIDOPHILUS) TABS Take 1 tablet by mouth 3 times daily (before meals)       calcium-vitamin D (CALTRATE) 600-400 MG-UNIT per tablet Take 1 tablet by mouth 2 times daily         Family History:  No family history on file.    Physical Exam:  BP (!) 165/105  Pulse 93  Temp 98.2  F (36.8  C) (Oral)  Resp 18  Ht 1.6 m (5' 3\")  Wt 85.3 kg (188 lb)  SpO2 97%  BMI 33.3 kg/m2    General: Sitting in the chair in NAD  HEENT: anicteric, moist mucosa  Neck: Full ROM  Chest: no audible wheeze.  Able to speak in full sentences  Abdomen: Flat  Extremities: No LE Edema  Neuro: A&Ox3, no gross defecits  Skin: no rash noted        Labs and Radiology:  CT Chest:  IMPRESSION:   1. Slight increased in prominence of apical predominant tree-in-bud  opacities suggesting " "infectious or inflammatory bronchiolitis.  2. Unchanged lingular and right middle lobe scarring with traction  bronchiectasis.  3. Scattered solid pulmonary nodules, the largest measuring 5 mm in  the left upper lobe. This may have slightly increased in size since  9/20/2016, though artifact from volume averaging likely explains the  slight difference in size. Consider follow-up chest CT in one year to  establish stability, or sooner if clinically indicated.  4. Mild scattered air trapping consistent with small airway disease.  5. Asymmetric soft tissue anterior to the right humeral head, only  partially seen, could represent effusion, artifact, or infection or  hematoma. Clinical correlation recommended. If clinically indicated,  may consider CT or MRI for further evaluation.     Endobronchial Biopsy   COMMENT:   There is squamous metaplasia in the respiratory mucosa showing   high-grade dysplasia with hyperchromatic and enlarged nuclei, moderate   cytologic pleomorphism and mitoses observed at the junction between the   middle and upper thirds of the squamous epithelium. There is no   significant thickening of the epithelium, however, therefore there is no   definite evidence of carcinoma in-situ in this specimen. Also, there is   no evidence of invasive malignancy. A few eosinophils are seen in the   respiratory mucosa, suggesting an asthmatic/allergic component. Close   clinical follow-up is recommended.     PFT's:  Normal FENO testing today    Assessment and Plan:  Davida Reynaga is a 71 year old female with chronic cough presenting to clinic today for follow up of extensive chronic cough evaluation.    With small airway disease on her imaging, inflammatory lung tissue with eosinophils, and negative FENO I think an overarching diagnosis of allergic cough is most appropriate.  This confirms her primary pulmonologist's suspicion and fits with her previous history of improvement in the cough when on \"allergy " "shots\".    -Still waiting on AFB culture but so far is negative  -Recommend she start on LAMA/LABA and follow for improvement.  Considered ICS but will hold off for now given her desire to get back in to singing.    -Referral to the allergist for repeat testing to try and identify a source  -Discharge back to Dr. Rizzo in [place] for ongoing care.       Seen and staffed with Dr. Salazar.    Francis Walker MD  Pulmonary and Critical Care Fellow  [phone]    Pulmonary  Attending Note    The patient was seen and examined by me with .   The case was discussed at length. Pertinent vitals, lab results and imaging were reviewed by me. Agree with the fellow's note from today as reflects our joint assessment and plan.    Attila Salazar MD          "

## 2017-03-31 NOTE — PROGRESS NOTES
"Pulmonary Clinic Follow-Up Visit Note    CC: \"Chronic cough\"      HPI:   Ms. Reynaga returns to clinic to talk about her bronchoscopy, biopsy, CT and lab results in the workup of her chronic cough.    Please refer to my previous notes, but in brief the cough has been present for > 25 years and was previously effectively treated with \"allergy shots.\" She's not gotten benefit/relief in recent years no oral allergy medications.      No new complaints today.  Cough persists unchanged.      PMH:  Discussed with the patient    Allergies:  Allergies   Allergen Reactions     Ciprofloxacin        Social History:  Social History     Social History     Marital status: Single     Spouse name: N/A     Number of children: N/A     Years of education: N/A     Occupational History     Not on file.     Social History Main Topics     Smoking status: Never Smoker     Smokeless tobacco: Not on file     Alcohol use Not on file     Drug use: Not on file     Sexual activity: Not on file     Other Topics Concern     Not on file     Social History Narrative       Medications:  Current Outpatient Prescriptions   Medication Sig Dispense Refill     aspirin 81 MG tablet Take by mouth daily       atorvastatin (LIPITOR) 40 MG tablet Take 40 mg by mouth daily       clonazePAM (KLONOPIN) 0.5 MG tablet Take 0.5 mg by mouth 2 times daily as needed for anxiety       hydrochlorothiazide (MICROZIDE) 12.5 MG capsule Take 12.5 mg by mouth daily       losartan (COZAAR) 100 MG tablet Take 100 mg by mouth daily       MELATONIN PO Take 10 mg by mouth       omeprazole (PRILOSEC) 40 MG capsule Take by mouth daily       FLUoxetine (PROZAC) 10 MG capsule Take 50 mg by mouth daily       ranitidine (ZANTAC) 150 MG tablet Take by mouth 2 times daily       Senna 176 MG/5ML SYRP        pseudoePHEDrine (SUDAFED 12 HOUR) 120 MG 12 hr tablet Take 120 mg by mouth every 12 hours Reported on 3/7/2017       Multiple Vitamins-Minerals (THERAPEUTIC M PO)        timolol " "(TIMOPTIC-XE) 0.25 % ophthalmic gel-form 1 drop every morning       traZODone (DESYREL) 50 MG tablet Take by mouth At Bedtime       Cholecalciferol (VITAMIN D-3) 1000 UNITS CAPS Take 2,000 Units by mouth       buPROPion (WELLBUTRIN XL) 150 MG 24 hr tablet Take 150 mg by mouth every morning       guaiFENesin-codeine (ROBITUSSIN AC) 100-10 MG/5ML SOLN solution Take 1-2 tsp. by mouth every 4 hours as needed for cough Reported on 3/7/2017       HYDROcodone-acetaminophen (NORCO) 5-325 MG per tablet Take 1 tablet by mouth as needed for moderate to severe pain Reported on 3/7/2017       albuterol (PROAIR HFA/PROVENTIL HFA/VENTOLIN HFA) 108 (90 BASE) MCG/ACT Inhaler Inhale 2 puffs into the lungs every 6 hours Reported on 3/7/2017       cetirizine (ZYRTEC) 10 MG tablet Take 10 mg by mouth daily       Triamcinolone Acetonide (NASACORT AQ NA)        Lactobacillus Acid-Pectin (LACTOBACILLUS ACIDOPHILUS) TABS Take 1 tablet by mouth 3 times daily (before meals)       calcium-vitamin D (CALTRATE) 600-400 MG-UNIT per tablet Take 1 tablet by mouth 2 times daily         Family History:  No family history on file.    Physical Exam:  BP (!) 165/105  Pulse 93  Temp 98.2  F (36.8  C) (Oral)  Resp 18  Ht 1.6 m (5' 3\")  Wt 85.3 kg (188 lb)  SpO2 97%  BMI 33.3 kg/m2    General: Sitting in the chair in NAD  HEENT: anicteric, moist mucosa  Neck: Full ROM  Chest: no audible wheeze.  Able to speak in full sentences  Abdomen: Flat  Extremities: No LE Edema  Neuro: A&Ox3, no gross defecits  Skin: no rash noted        Labs and Radiology:  CT Chest:  IMPRESSION:   1. Slight increased in prominence of apical predominant tree-in-bud  opacities suggesting infectious or inflammatory bronchiolitis.  2. Unchanged lingular and right middle lobe scarring with traction  bronchiectasis.  3. Scattered solid pulmonary nodules, the largest measuring 5 mm in  the left upper lobe. This may have slightly increased in size since  9/20/2016, though artifact " "from volume averaging likely explains the  slight difference in size. Consider follow-up chest CT in one year to  establish stability, or sooner if clinically indicated.  4. Mild scattered air trapping consistent with small airway disease.  5. Asymmetric soft tissue anterior to the right humeral head, only  partially seen, could represent effusion, artifact, or infection or  hematoma. Clinical correlation recommended. If clinically indicated,  may consider CT or MRI for further evaluation.     Endobronchial Biopsy   COMMENT:   There is squamous metaplasia in the respiratory mucosa showing   high-grade dysplasia with hyperchromatic and enlarged nuclei, moderate   cytologic pleomorphism and mitoses observed at the junction between the   middle and upper thirds of the squamous epithelium. There is no   significant thickening of the epithelium, however, therefore there is no   definite evidence of carcinoma in-situ in this specimen. Also, there is   no evidence of invasive malignancy. A few eosinophils are seen in the   respiratory mucosa, suggesting an asthmatic/allergic component. Close   clinical follow-up is recommended.     PFT's:  Normal FENO testing today    Assessment and Plan:  Davida Reynaga is a 71 year old female with chronic cough presenting to clinic today for follow up of extensive chronic cough evaluation.    With small airway disease on her imaging, inflammatory lung tissue with eosinophils, and negative FENO I think an overarching diagnosis of allergic cough is most appropriate.  This confirms her primary pulmonologist's suspicion and fits with her previous history of improvement in the cough when on \"allergy shots\".    -Still waiting on AFB culture but so far is negative  -Recommend she start on LAMA/LABA and follow for improvement.  Considered ICS but will hold off for now given her desire to get back in to singing.    -Referral to the allergist for repeat testing to try and identify a " source  -Discharge back to Dr. Rizzo in [place] for ongoing care.       Seen and staffed with Dr. Salazar.    Francis Walker MD  Pulmonary and Critical Care Fellow  [phone]    Answers for HPI/ROS submitted by the patient on 3/31/2017   General Symptoms: Yes  Skin Symptoms: No  HENT Symptoms: Yes  EYE SYMPTOMS: No  HEART SYMPTOMS: No  LUNG SYMPTOMS: Yes  INTESTINAL SYMPTOMS: Yes  URINARY SYMPTOMS: Yes  GYNECOLOGIC SYMPTOMS: Yes  BREAST SYMPTOMS: No  SKELETAL SYMPTOMS: Yes  BLOOD SYMPTOMS: No  NERVOUS SYSTEM SYMPTOMS: Yes  MENTAL HEALTH SYMPTOMS: No  Fever: No  Loss of appetite: No  Weight loss: No  Weight gain: No  Fatigue: Yes  Night sweats: No  Chills: No  Increased stress: Yes  Excessive hunger: No  Excessive thirst: No  Feeling hot or cold when others believe the temperature is normal: No  Loss of height: No  Post-operative complications: No  Surgical site pain: No  Hallucinations: No  Change in or Loss of Energy: Yes  Hyperactivity: No  Confusion: No  Ear pain: No  Ear discharge: No  Hearing loss: No  Tinnitus: No  Nosebleeds: No  Congestion: Yes  Sinus pain: No  Trouble swallowing: No   Voice hoarseness: No  Mouth sores: No  Sore throat: No  Tooth pain: No  Gum tenderness: No  Bleeding gums: Yes  Change in taste: No  Change in sense of smell: No  Dry mouth: No  Hearing aid used: No  Neck lump: No  Cough: Yes  Sputum or phlegm: Yes  Coughing up blood: No  Difficulty breating or shortness of breath: Yes  Snoring: No  Wheezing: No  Difficulty breathing on exertion: Yes  Respiratory pain: No  Nighttime Cough: Yes  Difficulty breathing when lying flat: No    Pulmonary  Attending Note    The patient was seen and examined by me with .   The case was discussed at length. Pertinent vitals, lab results and imaging were reviewed by me. Agree with the fellow's note from today as reflects our joint assessment and plan.    Attila Salazar MD

## 2017-03-31 NOTE — MR AVS SNAPSHOT
After Visit Summary   3/31/2017    Davida Reynaga    MRN: [id number]           Patient Information     Date Of Birth          1945        Visit Information        Provider Department      3/31/2017 1:20 PM Cheng Walker MD [facility]        Today's Diagnoses     Allergic cough    -  1      Care Instructions    -With the biopsy results, pulmonary function tests and CT scan showing air trapping/small airway disease, we can say definitively that chronic allergic irritation is the cause of your chronic cough.    -I would recommend you start Stiolto (Tiotropium/olodaterol combination) or Anoro (umeclidinium/vilanterol combination) as a first step, and an inhaled corticosteroid may also be helpful though with your singing background we could try without this at first.      -If that doesn't improve your cough, repeat allergy would be the next step, followed by a full speech pathology eval and treatment.      - Follow up with your pulmonologist in [place] and you're always welcome to come back to the [facility] if you'd prefer.      - I'll send a referral to our allergist, Dr. Gilliland, for you.        Follow-ups after your visit        Additional Services     Allergy IP Consult                 Follow-up notes from your care team     Return if symptoms worsen or fail to improve.      Your next 10 appointments already scheduled     Apr 10, 2017  8:00 AM CDT   (Arrive by 7:45 AM)   RETURN SLP VOICE with BRANNON Thakkar    [facility] ([facility])    [address] [place] [address]   [phone]            Apr 17, 2017  8:00 AM CDT   (Arrive by 7:45 AM)   RETURN SLP VOICE with BRANNON Thakkar    [facility] ([facility])    [address] [place] [address]   [phone]            Jun 12, 2017 12:15 PM CDT   (Arrive by 12:00 PM)   New Allergy with  "Iftikhar Gilliland MD   [facility] ([facility])    [address]  [address] [facility] [address] [phone]           Do not take anti-histamines or Zantac for seven day prior to your appointment.              Who to contact     If you have questions or need follow up information about today's clinic visit or your schedule please contact [facility] directly at [phone].  Normal or non-critical lab and imaging results will be communicated to you by Pivtohart, letter or phone within 4 business days after the clinic has received the results. If you do not hear from us within 7 days, please contact the clinic through [employer] or phone. If you have a critical or abnormal lab result, we will notify you by phone as soon as possible.  Submit refill requests through Chairish or call your pharmacy and they will forward the refill request to us. Please allow 3 business days for your refill to be completed.          Additional Information About Your Visit        PivtoharWefunder Information     Chairish gives you secure access to your electronic health record. If you see a primary care provider, you can also send messages to your care team and make appointments. If you have questions, please call your primary care clinic.  If you do not have a primary care provider, please call [phone] and they will assist you.        Care EveryWhere ID     This is your Care EveryWhere ID. This could be used by other organizations to access your [facility] medical records  [id number]        Your Vitals Were     Pulse Temperature Respirations Height Pulse Oximetry BMI (Body Mass Index)    93 98.2  F (36.8  C) (Oral) 18 1.6 m (5' 3\") 97% 33.3 kg/m2       Blood Pressure from Last 3 Encounters:   03/31/17 (!) 165/105   03/22/17 (!) 132/124   03/03/17 147/85    Weight from Last 3 Encounters:   03/31/17 85.3 kg (188 lb)   03/22/17 85.3 kg (188 " lb)              We Performed the Following     Allergy IP Consult        Primary Care Provider    None Specified       No primary provider on file.        Thank you!     Thank you for choosing [facility]  for your care. Our goal is always to provide you with excellent care. Hearing back from our patients is one way we can continue to improve our services. Please take a few minutes to complete the written survey that you may receive in the mail after your visit with us. Thank you!             Your Updated Medication List - Protect others around you: Learn how to safely use, store and throw away your medicines at www.disposemymeds.org.          This list is accurate as of: 3/31/17  2:22 PM.  Always use your most recent med list.                   Brand Name Dispense Instructions for use    albuterol 108 (90 BASE) MCG/ACT Inhaler    PROAIR HFA/PROVENTIL HFA/VENTOLIN HFA     Inhale 2 puffs into the lungs every 6 hours Reported on 3/7/2017       aspirin 81 MG tablet      Take by mouth daily       atorvastatin 40 MG tablet    LIPITOR     Take 40 mg by mouth daily       calcium-vitamin D 600-400 MG-UNIT per tablet    CALTRATE     Take 1 tablet by mouth 2 times daily       cetirizine 10 MG tablet    zyrTEC     Take 10 mg by mouth daily       clonazePAM 0.5 MG tablet    klonoPIN     Take 0.5 mg by mouth 2 times daily as needed for anxiety       guaiFENesin-codeine 100-10 MG/5ML Soln solution    ROBITUSSIN AC     Take 1-2 tsp. by mouth every 4 hours as needed for cough Reported on 3/7/2017       hydrochlorothiazide 12.5 MG capsule    MICROZIDE     Take 12.5 mg by mouth daily       HYDROcodone-acetaminophen 5-325 MG per tablet    NORCO     Take 1 tablet by mouth as needed for moderate to severe pain Reported on 3/7/2017       lactobacillus acidophilus Tabs      Take 1 tablet by mouth 3 times daily (before meals)       losartan 100 MG tablet    COZAAR     Take 100 mg by mouth daily        MELATONIN PO      Take 10 mg by mouth       NASACORT AQ NA          omeprazole 40 MG capsule    priLOSEC     Take by mouth daily       PROZAC 10 MG capsule   Generic drug:  FLUoxetine      Take 50 mg by mouth daily       ranitidine 150 MG tablet    ZANTAC     Take by mouth 2 times daily       Senna 176 MG/5ML Syrp          SUDAFED 12 HOUR 120 MG 12 hr tablet   Generic drug:  pseudoePHEDrine      Take 120 mg by mouth every 12 hours Reported on 3/7/2017       THERAPEUTIC M PO          timolol 0.25 % ophthalmic gel-form    TIMOPTIC-XE     1 drop every morning       traZODone 50 MG tablet    DESYREL     Take by mouth At Bedtime       Vitamin D-3 1000 UNITS Caps      Take 2,000 Units by mouth       WELLBUTRIN  MG 24 hr tablet   Generic drug:  buPROPion      Take 150 mg by mouth every morning

## 2017-03-31 NOTE — PATIENT INSTRUCTIONS
-With the biopsy results, pulmonary function tests and CT scan showing air trapping/small airway disease, we can say definitively that chronic allergic irritation is the cause of your chronic cough.    -I would recommend you start Stiolto (Tiotropium/olodaterol combination) or Anoro (umeclidinium/vilanterol combination) as a first step, and an inhaled corticosteroid may also be helpful though with your singing background we could try without this at first.      -If that doesn't improve your cough, repeat allergy would be the next step, followed by a full speech pathology eval and treatment.      - Follow up with your pulmonologist in [place] and you're always welcome to come back to the [facility] if you'd prefer.      - I'll send a referral to our allergist, Dr. Gilliland, for you.

## 2017-03-31 NOTE — NURSING NOTE
Chief Complaint   Patient presents with     Chronic Cough     Follow up on Dorothy and her chronic cough     Oskar Chen CMA at 1:05 PM on 3/31/2017

## 2017-04-03 LAB
A FUMIGATUS IGE QN: NORMAL KU(A)/L
IGE SERPL-ACNC: 10 KIU/L (ref 0–114)

## 2017-04-06 LAB — A FUMIGATUS IGG SER-MCNC: 24.5 UG/ML

## 2017-04-10 ENCOUNTER — OFFICE VISIT (OUTPATIENT)
Dept: OTOLARYNGOLOGY | Facility: CLINIC | Age: 72
End: 2017-04-10

## 2017-04-10 DIAGNOSIS — R05.3 CHRONIC COUGH: Primary | ICD-10-CM

## 2017-04-10 DIAGNOSIS — R49.0 DYSPHONIA: ICD-10-CM

## 2017-04-10 NOTE — PROGRESS NOTES
[facility]  Yousuf Fung Jr., M.D., F.A.C.S.  Judy Villanueva M.D., M.P.H.  Irma Ramirez, Ph.D., CCC/SLP  Sheri Medina M.M. (voice), M.KRISTEN., CCC/SLP  Ehsan Nova M.M. (voice), MXIMENA., [facility]/SLP    [facility]  VOICE/SPEECH/BREATHING THERAPY PROGRESS REPORT    Patient: Davida Reynaga  Date of Service: 4/10/2017    PROGRESS SINCE LAST SESSION  Ms. Reynaga was seen for evaluation on 3/30/17.  At that time, it was determined that:    Based on the results of the laryngeal exam, Ms. Reynaga demonstrates a relatively healthy larynx with no visible lesions. However, the vocal folds demonstrate edema along the vibratory margins that is often consistent with frequent coughing, and she also demonstrates moderate to severe hyperfunction during connected speech.    Dysphonia/discomfort is accounted for by the hyperfunction and imbalanced function of the intrinsic and extrinsic laryngeal musculature     Cough/throat clear are accounted for by the hypersensitivity of the larynx and pharynx as evidenced by case history, patient complaints and absence of other organic findings; hypersensitivity is compounded by imbalance in the function of the intrinsic and extrinsic laryngeal musculature during connected speech    A course of speech therapy is recommended to optimize vocal technique, improve voice quality, promote reduced discomfort, effort and fatigue and help reduce chronic cough, throat clear and mucosal irritation.    She is entirely amenable to this plan    We began therapy today, working on strategies to improve vocal health and technique     Regarding practice, Ms. Reynaga reports the following:     Begun daily practice with and without the recording    The after visit summary is helpful to facilitate practice.    Ms. Reynaga also states that:    Focusing on suppression strategies and reducing frequency of cough.  Especially, sipping water, recognizing when she coughs, gargling, lozenges    Bend over  = cough    Straighten up = better/ stops    Ms. Reynaga presents today with the following:  VOICE:    Mild breathiness    Mild to moderate roughness    No perceptible strain with conversational speech; mild to moderate strain in upper range.    Habitual pitch is Eb3; low, but WNL  COUGH/AIRWAY:    Frequent    Increased in conjunction with talking    Dry    Locus of cough/ throat clear: sounds consistent with upper airway     Severity: moderate to servere/ marked    Percent of time symptoms are self controlled: 65% per clinician observation    THERAPEUTIC ACTIVITIES  Today Ms. Reynaga participated in the following therapeutic activities:    Asked many questions about the nature of her symptoms, and I answered all of these thoroughly.    Provided additional recommendations and strategies to help improve oral and laryngeal hydration, and reduce cough    Demonstrated previous exercises.  o demonstrated improved technique  o appropriate redirection provided  o instruction provided for increased level of complexity/difficulty    [place] exercises for upper body relaxation during phonation.  o demonstrated moderate upper body tension  o good self-awareness  o improvement in voice quality during exercises    [place] exercises for optimal respiratory mechanics for speech and singing.  o I provided explanation of the anatomy and physiology of respiration for speech and singing; she found this to be helpful  o she demonstrated clavicular/neck/shoulder involvement in inhalation  o with guidance, learned improved abdominal relaxation for inhalation  o learned techniques for optimal airflow on exhalation  o good learning, but will need practice    [place] exercises to add phonation to the optimal flowing airstream.  o Semi-occluded vocal tract exercises with a straw phonation and water resistance were most facilitating  o Instructed to use as a voice warm up, cool down, coordination of breath flow with phonation, and for tissue  mobilization.  o good learning, but will need practice     [place] exercises for improved airflow during phonation.  o speech material with glides was facilitating  o Instructed at the word and phrase level.  o Instructed with descending intervals of a 3rd and 5th, and well as a yawn-sigh pattern; this was helpful.  Progressed to an arpeggio pattern at the phrase level.  o learned techniques to reduce glottal reyes and improve breath flow    [place] concepts of an optimal regimen for practice.  o she should use an interval schedule of practice, with brief periods of practice frequently throughout each day  o [place] concepts of volitional practice to facilitate motor learning.    I provided an after visit summary to help facilitate practice.    An audio recording of today's therapeutic activities was provided, to facilitate practice.    IMPRESSIONS/GOALS/PLAN  Ms. Reynaga had a productive session of therapy today, working on techniques/strategies/exercises that will help her achieve her goal of acceptable and comfortable voice use, secondary to chronic cough and dysphonia; allowing her to meet personal and professional vocal demands and fully engage in activities of daily living.   She will continue to work on her exercises on a daily basis, and work on incorporating the techniques into her daily vocal activities.    Progress toward long-term goals:   Minimal at this point, as this is first session, but good learning today    Goals for this practice period:     practice all exercises according to instructions    incorporate techniques into daily vocal activities    maintain vigilance for vocal technique    Plan: I will see Ms. Reynaga in one week to work on education, modification, and carryover of therapeutic activities to more complex phonatory tasks.  After that time, we will follow resume therapy in May after her hip surgery.    PRIMARY ICD-10 code:  R05 (Chronic Cough)  SECONDARY ICD-10 code:  R49.0 (Dysphonia)       TOTAL SERVICE TIME: 60 minutes  TREATMENT (98875): 60 minutes  NO CHARGE FACILITY FEE (88577)    Sheri eMdina M.M. (voice) MRichardA., CCC/SLP  Speech-Language Pathologist  [facility]  [phone]

## 2017-04-10 NOTE — LETTER
4/10/2017       RE: Davida Reynaga  [address]  [facility] [address]     Dear Colleague,    Thank you for referring your patient, Davida Reynaga, to the  [facility] at [facility]. Please see a copy of my visit note below.          After Visit Summary    Patient: Davida Reynaga  Date of Visit: 4/10/2017    Hygiene:     Continue practice of cough suppression strategies:  What 2-3 strategies    Breathing:    In the morning and evening (twice daily) for 2-5 minutes:   o Breathe while lying on your back with your face and knees up. Hands on tummy and chest.  Take a breath in with rounded lips and exhale with a  Sh    o Inhale  = Inflate; exhale = deflate  o 3x each: try breathin in/8 out, 5/10, 3/4  o Throughout the day (2-3x/day for just a couple minutes) check breathing while keeping shoulders relaxed (riding to and from school, etc.)    Breathing Tips:  o Breathe in through rounded lips and out with a  sh   o Keep shoulders down  Voice:    Bubbles (straw in 1.5 to 2  of water):  o blow 10-15 seconds with no voice and keep bubbles consistent.  o 3x: blow bubbles and add a sustained  who  or an  oo  (D3)  o 3x: blow bubbles and vary  who  gliding up and down (1-5-1 - today moved from D3 up to D4, and back down)             Up and down like a sine wave  o 3x: blow bubbles on a sustained/ varied pitch soft to loud to soft (messa di voce) 1, 3, 1    These exercises are great for:    *warm up / cool down - Part of the morning routing and before and after extended voice use.    *tissue mobilization exercise - Improving the condition and pliability of the vocal folds.    *Abdominal breathing and applying optimal breath flow to speech/singing.        u  words (2-3 x per day)  o 5 words with use of arm or finger  o Breathe first and add a  yawn-sigh  shape to sound  o 3-1    5-1     Spacious speech phrases  (2-3x per day)  o 2nd column h+vowels 5-1  o First column, 5-1,  also 3-5-1 longer phrases    Sheri Medina M.M. (voice), MRichardA., CCC/SLP  Speech-Language Pathologist  [facility]  [phone]          [facility]  Yousuf Fung Jr., M.D., F.A.C.S.  Judy Villanueva M.D., M.P.H.  Irma Ramirez, Ph.D., CCC/SLP  Sheri Medina M.M. (voice), M.A., CCC/SLP  Ehsan Nova M.M. (voice), M.A., [facility]/SLP    [facility]  VOICE/SPEECH/BREATHING THERAPY PROGRESS REPORT    Patient: Davida Reynaga  Date of Service: 4/10/2017    PROGRESS SINCE LAST SESSION  Ms. Reynaga was seen for evaluation on 3/30/17.  At that time, it was determined that:    Based on the results of the laryngeal exam, Ms. Reynaga demonstrates a relatively healthy larynx with no visible lesions. However, the vocal folds demonstrate edema along the vibratory margins that is often consistent with frequent coughing, and she also demonstrates moderate to severe hyperfunction during connected speech.    Dysphonia/discomfort is accounted for by the hyperfunction and imbalanced function of the intrinsic and extrinsic laryngeal musculature     Cough/throat clear are accounted for by the hypersensitivity of the larynx and pharynx as evidenced by case history, patient complaints and absence of other organic findings; hypersensitivity is compounded by imbalance in the function of the intrinsic and extrinsic laryngeal musculature during connected speech    A course of speech therapy is recommended to optimize vocal technique, improve voice quality, promote reduced discomfort, effort and fatigue and help reduce chronic cough, throat clear and mucosal irritation.    She is entirely amenable to this plan    We began therapy today, working on strategies to improve vocal health and technique     Regarding practice, Ms. Reynaga reports the following:     Begun daily practice with and without the recording    The after visit summary is helpful to facilitate practice.    Ms. Reynaga also states that:    Focusing on  suppression strategies and reducing frequency of cough.  Especially, sipping water, recognizing when she coughs, gargling, lozenges    Bend over = cough    Straighten up = better/ stops    Ms. Reynaga presents today with the following:  VOICE:    Mild breathiness    Mild to moderate roughness    No perceptible strain with conversational speech; mild to moderate strain in upper range.    Habitual pitch is Eb3; low, but WNL  COUGH/AIRWAY:    Frequent    Increased in conjunction with talking    Dry    Locus of cough/ throat clear: sounds consistent with upper airway     Severity: moderate to servere/ marked    Percent of time symptoms are self controlled: 65% per clinician observation    THERAPEUTIC ACTIVITIES  Today Ms. Reynaga participated in the following therapeutic activities:    Asked many questions about the nature of her symptoms, and I answered all of these thoroughly.    Provided additional recommendations and strategies to help improve oral and laryngeal hydration, and reduce cough    Demonstrated previous exercises.  o demonstrated improved technique  o appropriate redirection provided  o instruction provided for increased level of complexity/difficulty    [place] exercises for upper body relaxation during phonation.  o demonstrated moderate upper body tension  o good self-awareness  o improvement in voice quality during exercises    [place] exercises for optimal respiratory mechanics for speech and singing.  o I provided explanation of the anatomy and physiology of respiration for speech and singing; she found this to be helpful  o she demonstrated clavicular/neck/shoulder involvement in inhalation  o with guidance, learned improved abdominal relaxation for inhalation  o learned techniques for optimal airflow on exhalation  o good learning, but will need practice    [place] exercises to add phonation to the optimal flowing airstream.  o Semi-occluded vocal tract exercises with a straw phonation and water  resistance were most facilitating  o Instructed to use as a voice warm up, cool down, coordination of breath flow with phonation, and for tissue mobilization.  o good learning, but will need practice     [place] exercises for improved airflow during phonation.  o speech material with glides was facilitating  o Instructed at the word and phrase level.  o Instructed with descending intervals of a 3rd and 5th, and well as a yawn-sigh pattern; this was helpful.  Progressed to an arpeggio pattern at the phrase level.  o learned techniques to reduce glottal reyes and improve breath flow    [place] concepts of an optimal regimen for practice.  o she should use an interval schedule of practice, with brief periods of practice frequently throughout each day  o [place] concepts of volitional practice to facilitate motor learning.    I provided an after visit summary to help facilitate practice.    An audio recording of today's therapeutic activities was provided, to facilitate practice.    IMPRESSIONS/GOALS/PLAN  Ms. Reynaga had a productive session of therapy today, working on techniques/strategies/exercises that will help her achieve her goal of acceptable and comfortable voice use, secondary to chronic cough and dysphonia; allowing her to meet personal and professional vocal demands and fully engage in activities of daily living.   She will continue to work on her exercises on a daily basis, and work on incorporating the techniques into her daily vocal activities.    Progress toward long-term goals:   Minimal at this point, as this is first session, but good learning today    Goals for this practice period:     practice all exercises according to instructions    incorporate techniques into daily vocal activities    maintain vigilance for vocal technique    Plan: I will see Ms. Reynaga in one week to work on education, modification, and carryover of therapeutic activities to more complex phonatory tasks.  After that time, we  will follow resume therapy in May after her hip surgery.    PRIMARY ICD-10 code:  R05 (Chronic Cough)  SECONDARY ICD-10 code:  R49.0 (Dysphonia)      TOTAL SERVICE TIME: 60 minutes  TREATMENT (98617): 60 minutes  NO CHARGE FACILITY FEE (00853)    Sheri Medina M.M. (voice) MXIMENA., CCC/SLP  Speech-Language Pathologist  [facility]  [phone]

## 2017-04-10 NOTE — MR AVS SNAPSHOT
After Visit Summary   4/10/2017    Davida Reynaga    MRN: [id number]           Patient Information     Date Of Birth          1945        Visit Information        Provider Department      4/10/2017 8:00 AM Sheri Medina [facility]        Today's Diagnoses     Chronic cough    -  1    Dysphonia           Follow-ups after your visit        Your next 10 appointments already scheduled     Apr 17, 2017  8:00 AM CDT   (Arrive by 7:45 AM)   RETURN SLP VOICE with BRANNON [facility] ([facility])    [address] [place] [address] [phone]            Jun 12, 2017 12:15 PM CDT   (Arrive by 12:00 PM)   New Allergy with Iftikhar Gilliland MD   [facility] ([facility])    [address] [place] [address] [phone]           Do not take anti-histamines or Zantac for seven day prior to your appointment.              Who to contact     Please call your clinic at [phone] to:    Ask questions about your health    Make or cancel appointments    Discuss your medicines    Learn about your test results    Speak to your doctor   If you have compliments or concerns about an experience at your clinic, or if you wish to file a complaint, please contact [facility] Patient Relations at [phone] or email us at [email or web address]         Additional Information About Your Visit        Mark Onehart Information     SocialVoltt gives you secure access to your electronic health record. If you see a primary care provider, you can also send messages to your care team and make appointments. If you have questions, please call your primary care clinic.  If you do not have a primary care provider, please call [phone] and they will assist you.      Shareable Ink is an electronic gateway that provides easy, online access  to your medical records. With Brandtree, you can request a clinic appointment, read your test results, renew a prescription or communicate with your care team.     To access your existing account, please contact your [facility] or call [phone] for assistance.        Care EveryWhere ID     This is your Care EveryWhere ID. This could be used by other organizations to access your [place] medical records  [id number]         Blood Pressure from Last 3 Encounters:   03/31/17 (!) 165/105   03/22/17 (!) 132/124   03/03/17 147/85    Weight from Last 3 Encounters:   03/31/17 85.3 kg (188 lb)   03/22/17 85.3 kg (188 lb)              We Performed the Following     SPEECH/HEARING THERAPY, INDIVIDUAL        Primary Care Provider    None Specified       No primary provider on file.        Thank you!     Thank you for choosing [facility]  for your care. Our goal is always to provide you with excellent care. Hearing back from our patients is one way we can continue to improve our services. Please take a few minutes to complete the written survey that you may receive in the mail after your visit with us. Thank you!             Your Updated Medication List - Protect others around you: Learn how to safely use, store and throw away your medicines at www.disposemymeds.org.          This list is accurate as of: 4/10/17 11:59 PM.  Always use your most recent med list.                   Brand Name Dispense Instructions for use    albuterol 108 (90 BASE) MCG/ACT Inhaler    PROAIR HFA/PROVENTIL HFA/VENTOLIN HFA     Inhale 2 puffs into the lungs every 6 hours Reported on 3/7/2017       aspirin 81 MG tablet      Take by mouth daily       atorvastatin 40 MG tablet    LIPITOR     Take 40 mg by mouth daily       calcium-vitamin D 600-400 MG-UNIT per tablet    CALTRATE     Take 1 tablet by mouth 2 times daily       cetirizine 10 MG tablet    zyrTEC     Take 10 mg by mouth daily       clonazePAM 0.5 MG tablet     klonoPIN     Take 0.5 mg by mouth 2 times daily as needed for anxiety       guaiFENesin-codeine 100-10 MG/5ML Soln solution    ROBITUSSIN AC     Take 1-2 tsp. by mouth every 4 hours as needed for cough Reported on 3/7/2017       hydrochlorothiazide 12.5 MG capsule    MICROZIDE     Take 12.5 mg by mouth daily       HYDROcodone-acetaminophen 5-325 MG per tablet    NORCO     Take 1 tablet by mouth as needed for moderate to severe pain Reported on 3/7/2017       lactobacillus acidophilus Tabs      Take 1 tablet by mouth 3 times daily (before meals)       losartan 100 MG tablet    COZAAR     Take 100 mg by mouth daily       MELATONIN PO      Take 10 mg by mouth       NASACORT AQ NA          omeprazole 40 MG capsule    priLOSEC     Take by mouth daily       PROZAC 10 MG capsule   Generic drug:  FLUoxetine      Take 50 mg by mouth daily       ranitidine 150 MG tablet    ZANTAC     Take by mouth 2 times daily       Senna 176 MG/5ML Syrp          SUDAFED 12 HOUR 120 MG 12 hr tablet   Generic drug:  pseudoePHEDrine      Take 120 mg by mouth every 12 hours Reported on 3/7/2017       THERAPEUTIC M PO          timolol 0.25 % ophthalmic gel-form    TIMOPTIC-XE     1 drop every morning       traZODone 50 MG tablet    DESYREL     Take by mouth At Bedtime       Vitamin D-3 1000 UNITS Caps      Take 2,000 Units by mouth       WELLBUTRIN  MG 24 hr tablet   Generic drug:  buPROPion      Take 150 mg by mouth every morning

## 2017-04-10 NOTE — PROGRESS NOTES
After Visit Summary    Patient: Davida Reynaga  Date of Visit: 4/10/2017    Hygiene:     Continue practice of cough suppression strategies:  What 2-3 strategies    Breathing:    In the morning and evening (twice daily) for 2-5 minutes:   o Breathe while lying on your back with your face and knees up. Hands on tummy and chest.  Take a breath in with rounded lips and exhale with a  Sh    o Inhale  = Inflate; exhale = deflate  o 3x each: try breathin in/8 out, 5/10, 3/4  o Throughout the day (2-3x/day for just a couple minutes) check breathing while keeping shoulders relaxed (riding to and from school, etc.)    Breathing Tips:  o Breathe in through rounded lips and out with a  sh   o Keep shoulders down  Voice:    Bubbles (straw in 1.5 to 2  of water):  o blow 10-15 seconds with no voice and keep bubbles consistent.  o 3x: blow bubbles and add a sustained  who  or an  oo  (D3)  o 3x: blow bubbles and vary  who  gliding up and down (1-5-1 - today moved from D3 up to D4, and back down)             Up and down like a sine wave  o 3x: blow bubbles on a sustained/ varied pitch soft to loud to soft (tani mclean voce) 1, 3, 1    These exercises are great for:    *warm up / cool down - Part of the morning routing and before and after extended voice use.    *tissue mobilization exercise - Improving the condition and pliability of the vocal folds.    *Abdominal breathing and applying optimal breath flow to speech/singing.        u  words (2-3 x per day)  o 5 words with use of arm or finger  o Breathe first and add a  yawn-sigh  shape to sound  o 3-1    5-1     Spacious speech phrases  (2-3x per day)  o 2nd column h+vowels 5-1  o First column, 5-1, also 3-5-1 longer phrases    Sheri Medina M.M. (voice), M.A., CCC/SLP  Speech-Language Pathologist  [facility]  [phone]

## 2017-04-13 DIAGNOSIS — R05.8 ALLERGIC COUGH: ICD-10-CM

## 2017-04-17 ENCOUNTER — OFFICE VISIT (OUTPATIENT)
Dept: OTOLARYNGOLOGY | Facility: CLINIC | Age: 72
End: 2017-04-17

## 2017-04-17 DIAGNOSIS — R05.3 CHRONIC COUGH: Primary | ICD-10-CM

## 2017-04-17 DIAGNOSIS — R49.0 DYSPHONIA: ICD-10-CM

## 2017-04-17 NOTE — MR AVS SNAPSHOT
After Visit Summary   4/17/2017    Davida Reynaga    MRN: [id number]           Patient Information     Date Of Birth          1945        Visit Information        Provider Department      4/17/2017 8:00 AM Sheri Medina SLP [facility]        Today's Diagnoses     Chronic cough    -  1    Dysphonia           Follow-ups after your visit        Your next 10 appointments already scheduled     Jun 12, 2017 12:15 PM CDT   (Arrive by 12:00 PM)   New Allergy with MD JEAN CARLOS [facility] ([facility])    [address] [place] [address]  [address]rd [facility] [address] [phone]           Do not take anti-histamines or Zantac for seven day prior to your appointment.            Jun 12, 2017  3:00 PM CDT   (Arrive by 2:45 PM)   Return Visit with BRANNON Thak[facility] ([facility])    [address] [place] [address]  [address] [facility] [address] [phone]              Who to contact     Please call your clinic at [phone] to:    Ask questions about your health    Make or cancel appointments    Discuss your medicines    Learn about your test results    Speak to your doctor   If you have compliments or concerns about an experience at your clinic, or if you wish to file a complaint, please contact [facility] Patient Relations at [phone] or email us at [email or web address]         Additional Information About Your Visit        Moathart Information     Simplebooklett gives you secure access to your electronic health record. If you see a primary care provider, you can also send messages to your care team and make appointments. If you have questions, please call your primary care clinic.  If you do not have a primary care provider, please call [phone] and they will assist you.      Clarity Software Solutions is an electronic gateway that provides easy, online access to  your medical records. With Tamago, you can request a clinic appointment, read your test results, renew a prescription or communicate with your care team.     To access your existing account, please contact your [facility] or call [phone] for assistance.        Care EveryWhere ID     This is your Care EveryWhere ID. This could be used by other organizations to access your [place] medical records  [id number]         Blood Pressure from Last 3 Encounters:   03/31/17 (!) 165/105   03/22/17 (!) 132/124   03/03/17 147/85    Weight from Last 3 Encounters:   03/31/17 85.3 kg (188 lb)   03/22/17 85.3 kg (188 lb)              We Performed the Following     SPEECH/HEARING THERAPY, INDIVIDUAL        Primary Care Provider    None Specified       No primary provider on file.        Thank you!     Thank you for choosing [facility]  for your care. Our goal is always to provide you with excellent care. Hearing back from our patients is one way we can continue to improve our services. Please take a few minutes to complete the written survey that you may receive in the mail after your visit with us. Thank you!             Your Updated Medication List - Protect others around you: Learn how to safely use, store and throw away your medicines at www.disposemymeds.org.          This list is accurate as of: 4/17/17 10:20 AM.  Always use your most recent med list.                   Brand Name Dispense Instructions for use    albuterol 108 (90 BASE) MCG/ACT Inhaler    PROAIR HFA/PROVENTIL HFA/VENTOLIN HFA     Inhale 2 puffs into the lungs every 6 hours Reported on 3/7/2017       aspirin 81 MG tablet      Take by mouth daily       atorvastatin 40 MG tablet    LIPITOR     Take 40 mg by mouth daily       calcium-vitamin D 600-400 MG-UNIT per tablet    CALTRATE     Take 1 tablet by mouth 2 times daily       cetirizine 10 MG tablet    zyrTEC     Take 10 mg by mouth daily       clonazePAM 0.5 MG tablet     klonoPIN     Take 0.5 mg by mouth 2 times daily as needed for anxiety       guaiFENesin-codeine 100-10 MG/5ML Soln solution    ROBITUSSIN AC     Take 1-2 tsp. by mouth every 4 hours as needed for cough Reported on 3/7/2017       hydrochlorothiazide 12.5 MG capsule    MICROZIDE     Take 12.5 mg by mouth daily       HYDROcodone-acetaminophen 5-325 MG per tablet    NORCO     Take 1 tablet by mouth as needed for moderate to severe pain Reported on 3/7/2017       lactobacillus acidophilus Tabs      Take 1 tablet by mouth 3 times daily (before meals)       losartan 100 MG tablet    COZAAR     Take 100 mg by mouth daily       MELATONIN PO      Take 10 mg by mouth       NASACORT AQ NA          omeprazole 40 MG capsule    priLOSEC     Take by mouth daily       PROZAC 10 MG capsule   Generic drug:  FLUoxetine      Take 50 mg by mouth daily       ranitidine 150 MG tablet    ZANTAC     Take by mouth 2 times daily       Senna 176 MG/5ML Syrp          SUDAFED 12 HOUR 120 MG 12 hr tablet   Generic drug:  pseudoePHEDrine      Take 120 mg by mouth every 12 hours Reported on 3/7/2017       THERAPEUTIC M PO          timolol 0.25 % ophthalmic gel-form    TIMOPTIC-XE     1 drop every morning       traZODone 50 MG tablet    DESYREL     Take by mouth At Bedtime       umeclidinium-vilanterol 62.5-25 MCG/INH oral inhaler    ANORO ELLIPTA    1 Inhaler    Inhale 1 puff into the lungs daily       Vitamin D-3 1000 UNITS Caps      Take 2,000 Units by mouth       WELLBUTRIN  MG 24 hr tablet   Generic drug:  buPROPion      Take 150 mg by mouth every morning

## 2017-04-17 NOTE — LETTER
4/17/2017       RE: Davida Reynaga  [address]  [place] [facility] [place] [facility] [address]     Dear Colleague,    Thank you for referring your patient, Davida Reynaga, to the [facility]. Please see a copy of my visit note below.    [facility]  Yousuf Fung Jr., M.D., F.A.C.S.  Judy Villanueva M.D., M.P.H.  Irma Ramirez, Ph.D., CCC/SLP  Sheri Medina M.M. (voice), MXIMENA., CCC/SLP  Ehsan Nova M.M. (voice), M.A., [facility]/SLP    [facility]  VOICE/SPEECH/BREATHING THERAPY PROGRESS REPORT    Patient: Davida Reynaga  Date of Service: 4/17/2017    PROGRESS SINCE LAST SESSION  Ms. Reynaga was last seen on 4/10/17. At that time, she worked on learning therapeutic activities to improve her voice quality and comfort, secondary to chronic cough and dysphonia; allowing her to meet personal and professional vocal demands and fully engage in activities of daily living.    Regarding practice, Ms. Reynaga reports the following:     daily practice without the recording, but believes it will be helpful over the next few weeks, as she is scheduled for hip surgery    the recording    The after visit summary is helpful to facilitate practice.    voice is improved during the exercises, but minimal carryover to spontaneous conversation    she experiences improvement in fatigue and discomfort while doing the exercises    Reports that the breathing exercises have been very helpful.    Ms. Reynaga presents today with the following:  VOICE:    Mild breathiness    Mild to moderate roughness    No perceptible strain with conversational speech; mild to moderate strain in upper range.    Habitual pitch is D3; low, but WNL  COUGH/AIRWAY:    No cough today; occasional dry throat clearing    THERAPEUTIC ACTIVITIES  Today Ms. Reynaga participated in the following therapeutic activities:    Demonstrated previous exercises.  o demonstrated improved technique  o appropriate redirection  "provided  o instruction provided for increased level of complexity/difficulty    Modified exercises to add phonation to the optimal flowing airstream.  o Semi-occluded vocal tract exercises with \"hum\"/ \"hun\"/ lip trills/ straw phonation with water resistance were most facilitating  o [place] a variety of exercises to allow for flexibility during her daily activities.  o Instructed to use as a voice warm up, cool down, coordination of breath flow with phonation, and for tissue mobilization.  o good learning, but will need practice     Exercises for improved airflow during phonation.  o speech material with easy onsets and glides was facilitating  o Instructed at the word and phrase level.  o learned techniques to reduce glottal reyes and improve breath flow    Darlington exercises to experience a more forward sensation during phonation.  o speech material with nasal continuants was facilitating  o speech material that elicits a high, forward tongue position was facilitating, as well as bilabial plosives.  o able to recognize improvement in quality and comfort  o able to progress to level of words and phrases  o good learning, but will need practice    [place] exercises for improving length of utterance with reduced effort for optimal carryover.  o Instructed with phrases of increasing length  o Good learning, but will need practice.    [place] concepts of an optimal regimen for practice.  o she should use an interval schedule of practice, with brief periods of practice frequently throughout each day  o [place]lington concepts of volitional practice to facilitate motor learning.    I provided an after visit summary to help facilitate practice.    An audio recording of today's therapeutic activities was provided, to facilitate practice.    IMPRESSIONS/GOALS/PLAN  Ms. Reynaga had a productive session of therapy today, working on techniques/strategies/exercises that will help her achieve her goal of acceptable and comfortable voice " use, secondary to chronic cough and dysphonia; allowing her to meet personal and professional vocal demands and fully engage in activities of daily living.     Progress toward long-term goals:   Adequate progress; too early for objective measures    Goals for this practice period:     practice all exercises according to instructions    incorporate techniques into daily vocal activities    maintain vigilance for vocal technique    Plan: I will see Ms. Reynaga in 4 weeks to work on education, modification, and carryover of therapeutic activities to more complex phonatory tasks.    PRIMARY ICD-10 code:  R05 (Chronic Cough)  SECONDARY ICD-10 code:  R49.0 (Dysphonia)      TOTAL SERVICE TIME: 60 minutes  TREATMENT (28944): 60 minutes  NO CHARGE FACILITY FEE (56836)     Sheri Medina M.M. (voice), M.A., CCC/SLP  Speech-Language Pathologist  [facility]  [phone]    After Visit Summary    Patient: Davida Reynaga  Date of Visit: 4/17/2017    Voice:    Hun/ hum/ lip trills/ Bubbles (straw in 1.5 to 2  of water) 2-3x/day 1-2min:  o blow 10-15 seconds with no voice and keep bubbles consistent.  o 3x: blow bubbles and add a sustained  who  or an  oo  (D3 )  o 3x: blow bubbles and vary  who  gliding up and down             Up and down like a sine wave  o 3x: blow bubbles on a sustained/ varied pitch soft to loud to soft (messa di voce)    o 1-2x: Happy birthday bubbles (keep connected)  These exercises are great for:    *warm up / cool down - Part of the morning routing and before and after extended voice use.    *tissue mobilization exercise - Improving the condition and pliability of the vocal folds.    *Abdominal breathing and applying optimal breath flow to speech/singing.        u  words (2-3 x per day)  o 5 words with use of arm  o Breathe first and add a  rainbow  shape to sound  o 5-1     Spacious speech phrases  (2-3x per day)  o First column    *relax the tongue - behind the lower teeth    Blending  o 5-1   "Short phrases    Progressed to 3-5-1 with longer phrases     m  words  o (M+ vowels first with 5-1)   5 words: hold onto the  m  at the beginning of the word 5-1, then speak     m  sentences  o 5 phrases 3-5-1, then speaking with a \"rainbow shape\"    Instructions for Life  o 1-10    \"rainbow shape\"    Breath first    Blend    Sheri Medina M.M. (voice), MALLY, CCC/SLP  Speech-Language Pathologist  [facility]  [phone]      Evelio: [phone] (scheduling)            "

## 2017-04-17 NOTE — PROGRESS NOTES
"After Visit Summary    Patient: Davida Reynaga  Date of Visit: 4/17/2017    Voice:    Hun/ hum/ lip trills/ Bubbles (straw in 1.5 to 2  of water) 2-3x/day 1-2min:  o blow 10-15 seconds with no voice and keep bubbles consistent.  o 3x: blow bubbles and add a sustained  who  or an  oo  (D3 )  o 3x: blow bubbles and vary  who  gliding up and down             Up and down like a sine wave  o 3x: blow bubbles on a sustained/ varied pitch soft to loud to soft (messa di voce)    o 1-2x: Happy birthday bubbles (keep connected)  These exercises are great for:    *warm up / cool down - Part of the morning routing and before and after extended voice use.    *tissue mobilization exercise - Improving the condition and pliability of the vocal folds.    *Abdominal breathing and applying optimal breath flow to speech/singing.        u  words (2-3 x per day)  o 5 words with use of arm  o Breathe first and add a  rainbow  shape to sound  o 5-1     Spacious speech phrases  (2-3x per day)  o First column    *relax the tongue - behind the lower teeth    Blending  o 5-1  Short phrases    Progressed to 3-5-1 with longer phrases     m  words  o (M+ vowels first with 5-1)   5 words: hold onto the  m  at the beginning of the word 5-1, then speak     m  sentences  o 5 phrases 3-5-1, then speaking with a \"rainbow shape\"    Instructions for Life  o 1-10    \"rainbow shape\"    Breath first    Blend    Sheri Medina M.M. (voice), M.A., CCC/SLP  Speech-Language Pathologist  [facility]  [phone]      Evelio: [phone] (scheduling)      "

## 2017-04-17 NOTE — PROGRESS NOTES
"[facility]ICE [facility]  Yousuf Fung Jr., M.D., F.A.C.S.  Judy Villanueva M.D., M.P.H.  Irma Ramirez, Ph.D., CCC/SLP  Sheri Medina M.M. (voice), M.KRISTEN., CCC/SLP  Ehsan Nova M.M. (voice), MXIMENA., [facility]/SLP    [facility]ICE [facility]  VOICE/SPEECH/BREATHING THERAPY PROGRESS REPORT    Patient: Davida Reynaga  Date of Service: 4/17/2017    PROGRESS SINCE LAST SESSION  Ms. Reynaga was last seen on 4/10/17. At that time, she worked on learning therapeutic activities to improve her voice quality and comfort, secondary to chronic cough and dysphonia; allowing her to meet personal and professional vocal demands and fully engage in activities of daily living.    Regarding practice, Ms. Reynaga reports the following:     daily practice without the recording, but believes it will be helpful over the next few weeks, as she is scheduled for hip surgery    the recording    The after visit summary is helpful to facilitate practice.    voice is improved during the exercises, but minimal carryover to spontaneous conversation    she experiences improvement in fatigue and discomfort while doing the exercises    Reports that the breathing exercises have been very helpful.    Ms. Reynaga presents today with the following:  VOICE:    Mild breathiness    Mild to moderate roughness    No perceptible strain with conversational speech; mild to moderate strain in upper range.    Habitual pitch is D3; low, but WNL  COUGH/AIRWAY:    No cough today; occasional dry throat clearing    THERAPEUTIC ACTIVITIES  Today Ms. Reynaga participated in the following therapeutic activities:    Demonstrated previous exercises.  o demonstrated improved technique  o appropriate redirection provided  o instruction provided for increased level of complexity/difficulty    Modified exercises to add phonation to the optimal flowing airstream.  o Semi-occluded vocal tract exercises with \"hum\"/ \"hun\"/ lip trills/ straw phonation with water resistance were most " facilitating  o [place] a variety of exercises to allow for flexibility during her daily activities.  o Instructed to use as a voice warm up, cool down, coordination of breath flow with phonation, and for tissue mobilization.  o good learning, but will need practice     Exercises for improved airflow during phonation.  o speech material with easy onsets and glides was facilitating  o Instructed at the word and phrase level.  o learned techniques to reduce glottal reyes and improve breath flow    [place] exercises to experience a more forward sensation during phonation.  o speech material with nasal continuants was facilitating  o speech material that elicits a high, forward tongue position was facilitating, as well as bilabial plosives.  o able to recognize improvement in quality and comfort  o able to progress to level of words and phrases  o good learning, but will need practice    [place] exercises for improving length of utterance with reduced effort for optimal carryover.  o Instructed with phrases of increasing length  o Good learning, but will need practice.    [place] concepts of an optimal regimen for practice.  o she should use an interval schedule of practice, with brief periods of practice frequently throughout each day  o [place] concepts of volitional practice to facilitate motor learning.    I provided an after visit summary to help facilitate practice.    An audio recording of today's therapeutic activities was provided, to facilitate practice.    IMPRESSIONS/GOALS/PLAN  Ms. Reynaga had a productive session of therapy today, working on techniques/strategies/exercises that will help her achieve her goal of acceptable and comfortable voice use, secondary to chronic cough and dysphonia; allowing her to meet personal and professional vocal demands and fully engage in activities of daily living.     Progress toward long-term goals:   Adequate progress; too early for objective measures    Goals for this  practice period:     practice all exercises according to instructions    incorporate techniques into daily vocal activities    maintain vigilance for vocal technique    Plan: I will see Ms. Reynaga in 4 weeks to work on education, modification, and carryover of therapeutic activities to more complex phonatory tasks.    PRIMARY ICD-10 code:  R05 (Chronic Cough)  SECONDARY ICD-10 code:  R49.0 (Dysphonia)      TOTAL SERVICE TIME: 60 minutes  TREATMENT (45480): 60 minutes  NO CHARGE FACILITY FEE (42781)     Sheri Medina M.M. (voice) M.A., CCC/SLP  Speech-Language Pathologist  [facility]  [phone]

## 2017-04-19 LAB
FUNGUS SPEC CULT: NORMAL
MICRO REPORT STATUS: NORMAL
SPECIMEN SOURCE: NORMAL

## 2017-04-25 LAB — FIO2-PRE: 21 %

## 2017-05-17 LAB
MICRO REPORT STATUS: NORMAL
MYCOBACTERIUM SPEC CULT: NORMAL
SPECIMEN SOURCE: NORMAL

## 2017-06-05 ENCOUNTER — TELEPHONE (OUTPATIENT)
Dept: PULMONOLOGY | Facility: CLINIC | Age: 72
End: 2017-06-05

## 2017-06-05 NOTE — TELEPHONE ENCOUNTER
Left message this am to avoid antihistamines and the medicine Zantac 7 days prior to apt with Dr Gilliland.

## 2017-06-06 ENCOUNTER — PRE VISIT (OUTPATIENT)
Dept: PULMONOLOGY | Facility: CLINIC | Age: 72
End: 2017-06-06

## 2017-06-06 NOTE — TELEPHONE ENCOUNTER
1.  Date/reason for appt: 6/12-17, Allergy  2.  Referring provider: FRANK PAZ  3.  Call to patient (Yes / No - short description): No, referred   4.  Previous care at / records requested from:   [facility]- office notes and imaging are in epic.

## 2017-06-07 ASSESSMENT — ENCOUNTER SYMPTOMS
ARTHRALGIAS: 1
JOINT SWELLING: 1
TROUBLE SWALLOWING: 0
DIFFICULTY URINATING: 0
STIFFNESS: 1
TASTE DISTURBANCE: 0
PANIC: 0
DYSURIA: 0
NERVOUS/ANXIOUS: 1
FATIGUE: 1
HEMOPTYSIS: 0
NAIL CHANGES: 0
HOARSE VOICE: 0
MUSCLE WEAKNESS: 1
INSOMNIA: 0
HEMATURIA: 0
SINUS PAIN: 0
FEVER: 0
NECK MASS: 0
POLYDIPSIA: 1
COUGH: 1
WEIGHT GAIN: 0
RESPIRATORY PAIN: 0
BACK PAIN: 1
MYALGIAS: 1
ALTERED TEMPERATURE REGULATION: 0
INCREASED ENERGY: 1
POLYPHAGIA: 0
CHILLS: 0
NECK PAIN: 1
SHORTNESS OF BREATH: 0
FLANK PAIN: 0
DEPRESSION: 1
SMELL DISTURBANCE: 0
SPUTUM PRODUCTION: 1
MUSCLE CRAMPS: 0
WHEEZING: 0
DECREASED CONCENTRATION: 0
SNORES LOUDLY: 0
POOR WOUND HEALING: 0
POSTURAL DYSPNEA: 0
COUGH DISTURBING SLEEP: 1
HALLUCINATIONS: 0
DYSPNEA ON EXERTION: 1
DECREASED APPETITE: 0
SORE THROAT: 0
NIGHT SWEATS: 0
SKIN CHANGES: 0
SINUS CONGESTION: 0
WEIGHT LOSS: 0

## 2017-06-12 ENCOUNTER — OFFICE VISIT (OUTPATIENT)
Dept: PULMONOLOGY | Facility: CLINIC | Age: 72
End: 2017-06-12
Attending: ALLERGY & IMMUNOLOGY
Payer: MEDICARE

## 2017-06-12 ENCOUNTER — OFFICE VISIT (OUTPATIENT)
Dept: OTOLARYNGOLOGY | Facility: CLINIC | Age: 72
End: 2017-06-12

## 2017-06-12 VITALS
DIASTOLIC BLOOD PRESSURE: 83 MMHG | HEART RATE: 70 BPM | WEIGHT: 185 LBS | OXYGEN SATURATION: 98 % | SYSTOLIC BLOOD PRESSURE: 136 MMHG | HEIGHT: 64 IN | BODY MASS INDEX: 31.58 KG/M2

## 2017-06-12 DIAGNOSIS — J30.89 ALLERGIC RHINITIS CAUSED BY MOLD: ICD-10-CM

## 2017-06-12 DIAGNOSIS — R49.0 DYSPHONIA: Primary | ICD-10-CM

## 2017-06-12 DIAGNOSIS — R05.9 COUGH: Primary | ICD-10-CM

## 2017-06-12 DIAGNOSIS — R05.3 CHRONIC COUGH: ICD-10-CM

## 2017-06-12 PROCEDURE — 99213 OFFICE O/P EST LOW 20 MIN: CPT | Mod: ZF

## 2017-06-12 PROCEDURE — 95004 PERQ TESTS W/ALRGNC XTRCS: CPT | Performed by: ALLERGY & IMMUNOLOGY

## 2017-06-12 PROCEDURE — 95024 IQ TESTS W/ALLERGENIC XTRCS: CPT | Performed by: ALLERGY & IMMUNOLOGY

## 2017-06-12 NOTE — NURSING NOTE
Chief Complaint   Patient presents with     Allergy Consult     Patient is being seen for consultation of allergies      Kathi Melendez CMA at 3:28 PM on 6/12/2017

## 2017-06-12 NOTE — MR AVS SNAPSHOT
After Visit Summary   6/12/2017    Davida Reynaga    MRN: [id number]           Patient Information     Date Of Birth          1945        Visit Information        Provider Department      6/12/2017 3:00 PM Sheri Medina, SLP [facility]        Today's Diagnoses     Dysphonia    -  1    Chronic cough           Follow-ups after your visit        Who to contact     Please call your clinic at [phone] to:    Ask questions about your health    Make or cancel appointments    Discuss your medicines    Learn about your test results    Speak to your doctor   If you have compliments or concerns about an experience at your clinic, or if you wish to file a complaint, please contact [facility] Patient Relations at [phone] or email us at [email or web address]         Additional Information About Your Visit        MyChart Information     [employer] gives you secure access to your electronic health record. If you see a primary care provider, you can also send messages to your care team and make appointments. If you have questions, please call your primary care clinic.  If you do not have a primary care provider, please call [phone] and they will assist you.      [employer] is an electronic gateway that provides easy, online access to your medical records. With [employer], you can request a clinic appointment, read your test results, renew a prescription or communicate with your care team.     To access your existing account, please contact your [facility] Clinic or call [phone] for assistance.        Care EveryWhere ID     This is your Care EveryWhere ID. This could be used by other organizations to access your [facility] medical records  [id number]         Blood Pressure from Last 3 Encounters:   06/12/17 136/83   03/31/17 (!) 165/105   03/22/17 (!) 132/124    Weight from Last 3 Encounters:   06/12/17 83.9 kg (185 lb)   03/31/17 85.3  kg (188 lb)   03/22/17 85.3 kg (188 lb)              We Performed the Following     SPEECH/HEARING THERAPY, INDIVIDUAL        Primary Care Provider    None Specified       No primary provider on file.        Thank you!     Thank you for choosing [facility]  for your care. Our goal is always to provide you with excellent care. Hearing back from our patients is one way we can continue to improve our services. Please take a few minutes to complete the written survey that you may receive in the mail after your visit with us. Thank you!             Your Updated Medication List - Protect others around you: Learn how to safely use, store and throw away your medicines at www.disposemymeds.org.          This list is accurate as of: 6/12/17 10:29 PM.  Always use your most recent med list.                   Brand Name Dispense Instructions for use    albuterol 108 (90 BASE) MCG/ACT Inhaler    PROAIR HFA/PROVENTIL HFA/VENTOLIN HFA     Inhale 2 puffs into the lungs every 6 hours Reported on 3/7/2017       aspirin 81 MG tablet      Take by mouth daily       atorvastatin 40 MG tablet    LIPITOR     Take 40 mg by mouth daily       calcium-vitamin D 600-400 MG-UNIT per tablet    CALTRATE     Take 1 tablet by mouth 2 times daily       cetirizine 10 MG tablet    zyrTEC     Take 10 mg by mouth daily       clonazePAM 0.5 MG tablet    klonoPIN     Take 0.5 mg by mouth 2 times daily as needed for anxiety       guaiFENesin-codeine 100-10 MG/5ML Soln solution    ROBITUSSIN AC     Take 1-2 tsp. by mouth every 4 hours as needed for cough Reported on 3/7/2017       hydrochlorothiazide 12.5 MG capsule    MICROZIDE     Take 12.5 mg by mouth daily       HYDROcodone-acetaminophen 5-325 MG per tablet    NORCO     Take 1 tablet by mouth as needed for moderate to severe pain Reported on 3/7/2017       lactobacillus acidophilus Tabs      Take 1 tablet by mouth 3 times daily (before meals)       losartan 100 MG tablet    COZAAR     Take 100 mg by  mouth daily       MELATONIN PO      Take 10 mg by mouth       NASACORT AQ NA          omeprazole 40 MG capsule    priLOSEC     Take by mouth daily       PROZAC 10 MG capsule   Generic drug:  FLUoxetine      Take 50 mg by mouth daily       ranitidine 150 MG tablet    ZANTAC     Take by mouth 2 times daily       Senna 176 MG/5ML Syrp          SUDAFED 12 HOUR 120 MG 12 hr tablet   Generic drug:  pseudoePHEDrine      Take 120 mg by mouth every 12 hours Reported on 3/7/2017       THERAPEUTIC M PO          timolol 0.25 % ophthalmic gel-form    TIMOPTIC-XE     1 drop every morning       traZODone 50 MG tablet    DESYREL     Take by mouth At Bedtime       umeclidinium-vilanterol 62.5-25 MCG/INH oral inhaler    ANORO ELLIPTA    1 Inhaler    Inhale 1 puff into the lungs daily       Vitamin D-3 1000 UNITS Caps      Take 2,000 Units by mouth       WELLBUTRIN  MG 24 hr tablet   Generic drug:  buPROPion      Take 150 mg by mouth every morning

## 2017-06-12 NOTE — MR AVS SNAPSHOT
"              After Visit Summary   6/12/2017    Davida Reynaga    MRN: [id number]           Patient Information     Date Of Birth          1945        Visit Information        Provider Department      6/12/2017 12:15 PM Iftikhar Gilliland MD [facility]        Today's Diagnoses     Cough    -  1    Allergic rhinitis caused by mold           Follow-ups after your visit        Follow-up notes from your care team     Return if symptoms worsen or fail to improve.      Who to contact     If you have questions or need follow up information about today's clinic visit or your schedule please contact [facility] directly at [phone].  Normal or non-critical lab and imaging results will be communicated to you by [employer], letter or phone within 4 business days after the clinic has received the results. If you do not hear from us within 7 days, please contact the clinic through [employer] or phone. If you have a critical or abnormal lab result, we will notify you by phone as soon as possible.  Submit refill requests through [employer] or call your pharmacy and they will forward the refill request to us. Please allow 3 business days for your refill to be completed.          Additional Information About Your Visit        MyChart Information     [employer] gives you secure access to your electronic health record. If you see a primary care provider, you can also send messages to your care team and make appointments. If you have questions, please call your primary care clinic.  If you do not have a primary care provider, please call [phone] and they will assist you.        Care EveryWhere ID     This is your Care EveryWhere ID. This could be used by other organizations to access your [facility] medical records  [id number]        Your Vitals Were     Pulse Height Pulse Oximetry BMI (Body Mass Index)          70 1.626 m (5' 4\") 98% 31.76 kg/m2         " Blood Pressure from Last 3 Encounters:   06/12/17 136/83   03/31/17 (!) 165/105   03/22/17 (!) 132/124    Weight from Last 3 Encounters:   06/12/17 83.9 kg (185 lb)   03/31/17 85.3 kg (188 lb)   03/22/17 85.3 kg (188 lb)              We Performed the Following     Intradermal test with allergic extract with number of test to be specified     Percutaneous test with allergic extract with number of test to be specified        Primary Care Provider    None Specified       No primary provider on file.        Thank you!     Thank you for choosing [facility]  for your care. Our goal is always to provide you with excellent care. Hearing back from our patients is one way we can continue to improve our services. Please take a few minutes to complete the written survey that you may receive in the mail after your visit with us. Thank you!             Your Updated Medication List - Protect others around you: Learn how to safely use, store and throw away your medicines at www.disposemymeds.org.          This list is accurate as of: 6/12/17  3:28 PM.  Always use your most recent med list.                   Brand Name Dispense Instructions for use    albuterol 108 (90 BASE) MCG/ACT Inhaler    PROAIR HFA/PROVENTIL HFA/VENTOLIN HFA     Inhale 2 puffs into the lungs every 6 hours Reported on 3/7/2017       aspirin 81 MG tablet      Take by mouth daily       atorvastatin 40 MG tablet    LIPITOR     Take 40 mg by mouth daily       calcium-vitamin D 600-400 MG-UNIT per tablet    CALTRATE     Take 1 tablet by mouth 2 times daily       cetirizine 10 MG tablet    zyrTEC     Take 10 mg by mouth daily       clonazePAM 0.5 MG tablet    klonoPIN     Take 0.5 mg by mouth 2 times daily as needed for anxiety       guaiFENesin-codeine 100-10 MG/5ML Soln solution    ROBITUSSIN AC     Take 1-2 tsp. by mouth every 4 hours as needed for cough Reported on 3/7/2017       hydrochlorothiazide 12.5 MG capsule    MICROZIDE      Take 12.5 mg by mouth daily       HYDROcodone-acetaminophen 5-325 MG per tablet    NORCO     Take 1 tablet by mouth as needed for moderate to severe pain Reported on 3/7/2017       lactobacillus acidophilus Tabs      Take 1 tablet by mouth 3 times daily (before meals)       losartan 100 MG tablet    COZAAR     Take 100 mg by mouth daily       MELATONIN PO      Take 10 mg by mouth       NASACORT AQ NA          omeprazole 40 MG capsule    priLOSEC     Take by mouth daily       PROZAC 10 MG capsule   Generic drug:  FLUoxetine      Take 50 mg by mouth daily       ranitidine 150 MG tablet    ZANTAC     Take by mouth 2 times daily       Senna 176 MG/5ML Syrp          SUDAFED 12 HOUR 120 MG 12 hr tablet   Generic drug:  pseudoePHEDrine      Take 120 mg by mouth every 12 hours Reported on 3/7/2017       THERAPEUTIC M PO          timolol 0.25 % ophthalmic gel-form    TIMOPTIC-XE     1 drop every morning       traZODone 50 MG tablet    DESYREL     Take by mouth At Bedtime       umeclidinium-vilanterol 62.5-25 MCG/INH oral inhaler    ANORO ELLIPTA    1 Inhaler    Inhale 1 puff into the lungs daily       Vitamin D-3 1000 UNITS Caps      Take 2,000 Units by mouth       WELLBUTRIN  MG 24 hr tablet   Generic drug:  buPROPion      Take 150 mg by mouth every morning

## 2017-06-12 NOTE — LETTER
"6/12/2017       RE: Davida Reynaga  [address]  [place] [address]     Dear Colleague,    Thank you for referring your patient, Davida Reynaga, to the [facility] VOICE at [facility]. Please see a copy of my visit note below.    [facility]  Yousuf Fung Jr., M.D., F.A.C.S.  Judy Villanueva M.D., M.P.H.  Irma Ramirez, Ph.D., CCC/SLP  Sheri Medina M.M. (voice), MXIMENA., CCC/SLP  Ehsan Nova M.M. (voice), M.A., [facility]/SLP    [facility]  VOICE/SPEECH/BREATHING THERAPY PROGRESS REPORT    Patient: Davida Reynaga  Date of Service: 6/12/2017    PROGRESS SINCE LAST SESSION  Ms. Reynaga was last seen on 4/17/17. At that time, she worked on learning therapeutic activities to improve her voice quality and comfort, secondary to chronic cough and dysphonia; allowing her to meet personal and professional vocal demands and fully engage in activities of daily living.   This session was completed while [employer] was down.    Regarding practice, Ms. Reynaga reports the following:     Irregular practice with and without the recording    the recording is helpful    The after visit summary is helpful to facilitate practice.    voice is improved during the exercises; improving carryover to spontaneous conversation    Ms. Reynaga also states that:    Cough had improved for a while; she found the semi-occluded vocal tract exercises with straw phonation and water resistance to be most helpful.    Had 2-3 coughing \"jags\" this week    Severity of cough is rated at 2-3/10 (10 being most severe) over the past week.    Voice quality has improved and is less rough    Ms. Reynaga presents today with the following:  Voice quality:    Mild intermittent roughness    Habitual pitch is around D3    A pitch glide task today showed that Ms. Reynaga can vocalize down to Gb2 and up to A4.     There is some instability around A3  Cough/ Throat clear:    Mild to moderate in severity    Had 1x " "coughing \"jag\" during today's session, which was difficult to discontinue.  She found relaxing to be helpful.  Other strategies: repeated sniffs, gargling, sipping water and bubbles, were helpful for calming her throat.    THERAPEUTIC ACTIVITIES  Today Ms. Reynaga participated in the following therapeutic activities:    Asked many questions about the nature of her symptoms, and I answered all of these thoroughly. [place] about the balance between the TA/CT  Muscles; this was helpful.    Demonstrated previous exercises.    demonstrated improved technique    appropriate redirection provided    instruction provided for increased level of complexity/difficulty    Modified exercises to add phonation to the optimal flowing airstream.    Semi-occluded vocal tract exercises with \"hum\"/ \"hun\"/ lip trills/ straw phonation with water resistance were most facilitating    [place] a variety of exercises to allow for flexibility during her daily activities.    Instructed to use as a voice warm up, cool down, coordination of breath flow with phonation, and for tissue mobilization.    good learning, but will need practice     Exercises for improved airflow during phonation.    speech material with easy onsets and glides was facilitating    Instructed at the word and phrase level.    Training with intervals of a descending 5th and an arpeggio pattern was helpful    learned techniques to reduce glottal reyes and improve breath flow    Exercises to experience a more forward sensation during phonation.    speech material with nasal continuants was facilitating    speech material that elicits a high, forward tongue position was facilitating, as well as bilabial plosives.    able to recognize improvement in quality and comfort    able to progress to level of words and phrases    Training with intervals of a descending 5th and an arpeggio pattern was helpful    good learning, but will need practice    [place] concepts of an optimal regimen " for practice.    she should use an interval schedule of practice, with brief periods of practice frequently throughout each day    [place] concepts of volitional practice to facilitate motor learning.    I provided an after visit summary to help facilitate practice.    An audio recording of today's therapeutic activities was provided, to facilitate practice.    IMPRESSIONS/GOALS/PLAN  Ms. Reynaga had a productive session of therapy today, working on techniques/strategies/exercises that will help her achieve her goal of acceptable and comfortable voice use, secondary to chronic cough and dysphonia; allowing her to meet personal and professional vocal demands and fully engage in activities of daily living.      Progress toward long-term goals:   Adequate progress; objective measures will be completed during her next session     Goals for this practice period:     practice all exercises according to instructions    incorporate techniques into daily vocal activities    maintain vigilance for vocal technique     Plan: I will see Ms. Reynaga in 4 weeks to work on education, modification, and carryover of therapeutic activities to more complex phonatory tasks.     PRIMARY ICD-10 code:  R05 (Chronic Cough)  SECONDARY ICD-10 code:  R49.0 (Dysphonia)       TOTAL SERVICE TIME: 60 minutes  TREATMENT (48175): 60 minutes  NO CHARGE FACILITY FEE (34956)      Sheri Medina M.M. (voice) MXIMENA., CCC/SLP  Speech-Language Pathologist  [facility]  [phone]

## 2017-06-12 NOTE — Clinical Note
6/12/2017       RE: Davida Reynaga  [address]  [facility] [address]     Dear Colleague,    Thank you for referring your patient, Davida Reynaga, to the [facility] at [facility]. Please see a copy of my visit note below.    Reason for Visit  Davida Reynaga is a 71 year old female who is referred by Cheng Walker MD for cough.  Allergy HPI   Dictation on: 06/12/2017  2:48 PM by: IFTIKHAR GIBBS [[email or web address]]         The patient was seen and examined by Iftikhar Gibbs MD   Current Outpatient Prescriptions   Medication     umeclidinium-vilanterol (ANORO ELLIPTA) 62.5-25 MCG/INH oral inhaler     aspirin 81 MG tablet     atorvastatin (LIPITOR) 40 MG tablet     clonazePAM (KLONOPIN) 0.5 MG tablet     hydrochlorothiazide (MICROZIDE) 12.5 MG capsule     losartan (COZAAR) 100 MG tablet     MELATONIN PO     omeprazole (PRILOSEC) 40 MG capsule     FLUoxetine (PROZAC) 10 MG capsule     ranitidine (ZANTAC) 150 MG tablet     Senna 176 MG/5ML SYRP     pseudoePHEDrine (SUDAFED 12 HOUR) 120 MG 12 hr tablet     Multiple Vitamins-Minerals (THERAPEUTIC M PO)     timolol (TIMOPTIC-XE) 0.25 % ophthalmic gel-form     traZODone (DESYREL) 50 MG tablet     Cholecalciferol (VITAMIN D-3) 1000 UNITS CAPS     buPROPion (WELLBUTRIN XL) 150 MG 24 hr tablet     guaiFENesin-codeine (ROBITUSSIN AC) 100-10 MG/5ML SOLN solution     HYDROcodone-acetaminophen (NORCO) 5-325 MG per tablet     albuterol (PROAIR HFA/PROVENTIL HFA/VENTOLIN HFA) 108 (90 BASE) MCG/ACT Inhaler     cetirizine (ZYRTEC) 10 MG tablet     Triamcinolone Acetonide (NASACORT AQ NA)     Lactobacillus Acid-Pectin (LACTOBACILLUS ACIDOPHILUS) TABS     calcium-vitamin D (CALTRATE) 600-400 MG-UNIT per tablet     No current facility-administered medications for this visit.      Allergies   Allergen Reactions     Ciprofloxacin      Social History     Social History     Marital status: Single      Spouse name: N/A     Number of children: N/A     Years of education: N/A     Occupational History     Not on file.     Social History Main Topics     Smoking status: Never Smoker     Smokeless tobacco: Not on file     Alcohol use Not on file     Drug use: Not on file     Sexual activity: Not on file     Other Topics Concern     Not on file     Social History Narrative     No past medical history on file.  No past surgical history on file.  No family history on file.      ROS   A complete ROS was otherwise negative except as noted in the HPI and the end of the note.  There were no vitals taken for this visit.  Exam:   GENERAL APPEARANCE: Well developed, well nourished, alert, and in no apparent distress.  EYES: PERRL, EOMI, conjunctiva clear non-injected  HENT: Nasal mucosa with no edema and no discharge. No nasal polyps. Mild facial tenderness.  EARS: Canals clear, TMs normal  MOUTH: Oral mucosa is moist, without any lesions, no tonsillar enlargement, no oropharyngeal exudate.  NECK: Supple, no masses, no thyromegaly.  LYMPHATICS: No significant cervical, or supraclavicular nodes.  RESP: Good air flow throughout.  No crackles. No rhonchi. No wheezes.  CV: Normal S1, S2, regular rhythm, normal rate. No murmur.  No rub. No gallop. No LE edema.   MS: Extremities normal. No clubbing. No cyanosis.  SKIN: No rashes noted  NEURO: Speech normal, normal strength and tone, normal gait and stance  PSYCH: Normal mentation, orientation to person, place, and time.  Results:38 percutaneous environmental allergen (and 2 controls) extracts placed by MA and read by MD.  All negative.  26 intradermal environmental allergen extracts placed by MA and read by MD, mild alternaria mold positive.          Assessment and plan:  Dictation on: 06/12/2017  2:52 PM by: RADHA MCCLELLAND [[email or web address]]               Answers for HPI/ROS submitted by the patient on 6/7/2017   General Symptoms: Yes  Skin Symptoms: Yes  HENT Symptoms: Yes  EYE SYMPTOMS:  No  HEART SYMPTOMS: No  LUNG SYMPTOMS: Yes  INTESTINAL SYMPTOMS: No  URINARY SYMPTOMS: Yes  GYNECOLOGIC SYMPTOMS: No  BREAST SYMPTOMS: No  SKELETAL SYMPTOMS: Yes  BLOOD SYMPTOMS: No  NERVOUS SYSTEM SYMPTOMS: No  MENTAL HEALTH SYMPTOMS: Yes  Fever: No  Loss of appetite: No  Weight loss: No  Weight gain: No  Fatigue: Yes  Night sweats: No  Chills: No  Increased stress: Yes  Excessive hunger: No  Excessive thirst: Yes  Feeling hot or cold when others believe the temperature is normal: No  Loss of height: No  Post-operative complications: No  Surgical site pain: Yes  Hallucinations: No  Change in or Loss of Energy: Yes  Hyperactivity: No  Confusion: No  Changes in hair: No  Changes in moles/birth marks: No  Itching: Yes  Rashes: Yes  Changes in nails: No  Acne: No  Hair in places you don't want it: No  Change in facial hair: No  Warts: Yes  Non-healing sores: No  Scarring: Yes  Flaking of skin: Yes  Color changes of hands/feet in cold : No  Sun sensitivity: No  Skin thickening: No  Ear pain: No  Ear discharge: No  Hearing loss: No  Tinnitus: No  Nosebleeds: No  Congestion: No  Sinus pain: No  Trouble swallowing: No   Voice hoarseness: No  Mouth sores: No  Sore throat: No  Tooth pain: No  Gum tenderness: Yes  Bleeding gums: Yes  Change in taste: No  Change in sense of smell: No  Dry mouth: Yes  Hearing aid used: No  Neck lump: No  Cough: Yes  Sputum or phlegm: Yes  Coughing up blood: No  Difficulty breating or shortness of breath: No  Snoring: No  Wheezing: No  Difficulty breathing on exertion: Yes  Respiratory pain: No  Nighttime Cough: Yes  Difficulty breathing when lying flat: No  Trouble holding urine or incontinence: Yes  Pain or burning: No  Trouble starting or stopping: No  Increased frequency of urination: No  Blood in urine: No  Decreased frequency of urination: No  Frequent nighttime urination: No  Flank pain: No  Difficulty emptying bladder: No  Back pain: Yes  Muscle aches: Yes  Neck pain: Yes  Swollen  joints: Yes  Joint pain: Yes  Bone pain: No  Muscle cramps: No  Muscle weakness: Yes  Joint stiffness: Yes  Bone fracture: No  Nervous or Anxious: Yes  Depression: Yes  Trouble sleeping: No  Trouble thinking or concentrating: No  Mood changes: No  Panic attacks: No      HISTORY OF PRESENT ILLNESS:  Davida has had a chronic cough for about 30 years.  She has seen doctors off and on for 5 years.  She has gone to [place] and various other doctors without too much relief.  Fifteen years ago she was allergy tested positive to cat and dog and had cats and dogs.  She started allergy shots and the cough went away.  She stopped the shots and the cough came back.  Five years ago she was allergy tested and said probably mold positive, but no cat or dogs.  She thinks the cough gets worse with pollen.  It is usually better in the winter.  It can be dry or productive.  She rarely coughs when she sleeps.  She has tried Flonase and antihistamines without much benefit.  She has used nasal cromolyn without much benefit.  She has some sneezing recently.  She recently saw Pulmonary Medicine here who thought it was *** allergy related.      SOCIAL HISTORY:  She has a cat and two dogs.  She does not smoke.  There are no smokers in her environment.       FAMILY HISTORY:  Brother who reacts significantly to poison axel.     Davida has a longstanding history of cough that interestingly got better years ago when she was on allergy shots; however, at this time she is not showing much in regards to allergens except for Alternaria mold.  Allergens tend to get better as people get older, so that is a possibility at her age.  It is also possible the allergy shots maintain a lasting benefit that were not picked up on any allergens.  At this time, without showing strong allergens except for a mild Alternaria mold which is typically an outdoor mold or in plants, I recommended her to continue her evaluation with other physicians.  She is working  with Speech to help with the cough which has helped.  Some control measures for Alternaria mold were given to her.  If she has not noticed any benefit on the nasal steroids, she can stop these; however, it may still be beneficial.  She can stop the antihistamines.  I can see her back as needed.      Again, thank you for allowing me to participate in the care of your patient.      Sincerely,    Iftikhar Gibbs MD

## 2017-06-12 NOTE — LETTER
6/12/2017      RE: Davida Reynaga  [address]  [facility] [address]       Reason for Visit  Davida Reynaga is a 71 year old female who is referred by Cheng Walker MD for cough.  Allergy HPI  HISTORY OF PRESENT ILLNESS:  Davida has had a chronic cough for about 30 years.  She has seen doctors off and on for 5 years.  She has gone to [place] and various other doctors without too much relief.  Fifteen years ago she was allergy tested positive to cat and dog and had cats and dogs.  She started allergy shots and the cough went away.  She stopped the shots and the cough came back.  Five years ago she was allergy tested and said probably mold positive, but no cat or dogs.  She thinks the cough gets worse with pollen.  It is usually better in the winter.  It can be dry or productive.  She rarely coughs when she sleeps.  She has tried Flonase and antihistamines without much benefit.  She has used nasal cromolyn without much benefit.  She has some sneezing recently.  She recently saw Pulmonary Medicine here who thought it was allergy related.      SOCIAL HISTORY:  She has a cat and two dogs.  She does not smoke.  There are no smokers in her environment.       FAMILY HISTORY:  Brother who reacts significantly to poison ivy.           The patient was seen and examined by Iftikhar Gibbs MD   Current Outpatient Prescriptions   Medication     umeclidinium-vilanterol (ANORO ELLIPTA) 62.5-25 MCG/INH oral inhaler     aspirin 81 MG tablet     atorvastatin (LIPITOR) 40 MG tablet     clonazePAM (KLONOPIN) 0.5 MG tablet     hydrochlorothiazide (MICROZIDE) 12.5 MG capsule     losartan (COZAAR) 100 MG tablet     MELATONIN PO     omeprazole (PRILOSEC) 40 MG capsule     FLUoxetine (PROZAC) 10 MG capsule     ranitidine (ZANTAC) 150 MG tablet     Senna 176 MG/5ML SYRP     pseudoePHEDrine (SUDAFED 12 HOUR) 120 MG 12 hr tablet     Multiple Vitamins-Minerals (THERAPEUTIC M PO)     timolol (TIMOPTIC-XE) 0.25 % ophthalmic  gel-form     traZODone (DESYREL) 50 MG tablet     Cholecalciferol (VITAMIN D-3) 1000 UNITS CAPS     buPROPion (WELLBUTRIN XL) 150 MG 24 hr tablet     guaiFENesin-codeine (ROBITUSSIN AC) 100-10 MG/5ML SOLN solution     HYDROcodone-acetaminophen (NORCO) 5-325 MG per tablet     albuterol (PROAIR HFA/PROVENTIL HFA/VENTOLIN HFA) 108 (90 BASE) MCG/ACT Inhaler     cetirizine (ZYRTEC) 10 MG tablet     Triamcinolone Acetonide (NASACORT AQ NA)     Lactobacillus Acid-Pectin (LACTOBACILLUS ACIDOPHILUS) TABS     calcium-vitamin D (CALTRATE) 600-400 MG-UNIT per tablet     No current facility-administered medications for this visit.      Allergies   Allergen Reactions     Ciprofloxacin      Social History     Social History     Marital status: Single     Spouse name: N/A     Number of children: N/A     Years of education: N/A     Occupational History     Not on file.     Social History Main Topics     Smoking status: Never Smoker     Smokeless tobacco: Not on file     Alcohol use Not on file     Drug use: Not on file     Sexual activity: Not on file     Other Topics Concern     Not on file     Social History Narrative     No past medical history on file.  No past surgical history on file.  No family history on file.      ROS   A complete ROS was otherwise negative except as noted in the HPI and the end of the note.  There were no vitals taken for this visit.  Exam:   GENERAL APPEARANCE: Well developed, well nourished, alert, and in no apparent distress.  EYES: PERRL, EOMI, conjunctiva clear non-injected  HENT: Nasal mucosa with no edema and no discharge. No nasal polyps. Mild facial tenderness.  EARS: Canals clear, TMs normal  MOUTH: Oral mucosa is moist, without any lesions, no tonsillar enlargement, no oropharyngeal exudate.  NECK: Supple, no masses, no thyromegaly.  LYMPHATICS: No significant cervical, or supraclavicular nodes.  RESP: Good air flow throughout.  No crackles. No rhonchi. No wheezes.  CV: Normal S1, S2, regular  rhythm, normal rate. No murmur.  No rub. No gallop. No LE edema.   MS: Extremities normal. No clubbing. No cyanosis.  SKIN: No rashes noted  NEURO: Speech normal, normal strength and tone, normal gait and stance  PSYCH: Normal mentation, orientation to person, place, and time.  Results:38 percutaneous environmental allergen (and 2 controls) extracts placed by MA and read by MD.  All negative.  26 intradermal environmental allergen extracts placed by MA and read by MD, mild alternaria mold positive.          Assessment and plan: Davida has a longstanding history of cough that interestingly got better years ago when she was on allergy shots; however, at this time she is not showing much in regards to allergens except for Alternaria mold.  Allergens tend to get better as people get older, so that is a possibility at her age.  It is also possible the allergy shots maintain a lasting benefit that were not picked up on any allergens.  At this time, without showing strong allergens except for a mild Alternaria mold which is typically an outdoor mold or in plants, I recommended her to continue her evaluation with other physicians.  She is working with Speech to help with the cough which has helped.  Some control measures for Alternaria mold were given to her.  If she has not noticed any benefit on the nasal steroids, she can stop these; however, it may still be beneficial.  She can stop the antihistamines.  I can see her back as needed.                     Iftikhar Gibbs MD

## 2017-06-12 NOTE — PROGRESS NOTES
Reason for Visit  Davida Reynaga is a 71 year old female who is referred by Cheng Walker MD for cough.  Allergy HPI  HISTORY OF PRESENT ILLNESS:  Davida has had a chronic cough for about 30 years.  She has seen doctors off and on for 5 years.  She has gone to [place] and various other doctors without too much relief.  Fifteen years ago she was allergy tested positive to cat and dog and had cats and dogs.  She started allergy shots and the cough went away.  She stopped the shots and the cough came back.  Five years ago she was allergy tested and said probably mold positive, but no cat or dogs.  She thinks the cough gets worse with pollen.  It is usually better in the winter.  It can be dry or productive.  She rarely coughs when she sleeps.  She has tried Flonase and antihistamines without much benefit.  She has used nasal cromolyn without much benefit.  She has some sneezing recently.  She recently saw Pulmonary Medicine here who thought it was allergy related.      SOCIAL HISTORY:  She has a cat and two dogs.  She does not smoke.  There are no smokers in her environment.       FAMILY HISTORY:  Brother who reacts significantly to poison ivy.           The patient was seen and examined by Iftikhar Gibbs MD   Current Outpatient Prescriptions   Medication     umeclidinium-vilanterol (ANORO ELLIPTA) 62.5-25 MCG/INH oral inhaler     aspirin 81 MG tablet     atorvastatin (LIPITOR) 40 MG tablet     clonazePAM (KLONOPIN) 0.5 MG tablet     hydrochlorothiazide (MICROZIDE) 12.5 MG capsule     losartan (COZAAR) 100 MG tablet     MELATONIN PO     omeprazole (PRILOSEC) 40 MG capsule     FLUoxetine (PROZAC) 10 MG capsule     ranitidine (ZANTAC) 150 MG tablet     Senna 176 MG/5ML SYRP     pseudoePHEDrine (SUDAFED 12 HOUR) 120 MG 12 hr tablet     Multiple Vitamins-Minerals (THERAPEUTIC M PO)     timolol (TIMOPTIC-XE) 0.25 % ophthalmic gel-form     traZODone (DESYREL) 50 MG tablet     Cholecalciferol (VITAMIN D-3)  1000 UNITS CAPS     buPROPion (WELLBUTRIN XL) 150 MG 24 hr tablet     guaiFENesin-codeine (ROBITUSSIN AC) 100-10 MG/5ML SOLN solution     HYDROcodone-acetaminophen (NORCO) 5-325 MG per tablet     albuterol (PROAIR HFA/PROVENTIL HFA/VENTOLIN HFA) 108 (90 BASE) MCG/ACT Inhaler     cetirizine (ZYRTEC) 10 MG tablet     Triamcinolone Acetonide (NASACORT AQ NA)     Lactobacillus Acid-Pectin (LACTOBACILLUS ACIDOPHILUS) TABS     calcium-vitamin D (CALTRATE) 600-400 MG-UNIT per tablet     No current facility-administered medications for this visit.      Allergies   Allergen Reactions     Ciprofloxacin      Social History     Social History     Marital status: Single     Spouse name: N/A     Number of children: N/A     Years of education: N/A     Occupational History     Not on file.     Social History Main Topics     Smoking status: Never Smoker     Smokeless tobacco: Not on file     Alcohol use Not on file     Drug use: Not on file     Sexual activity: Not on file     Other Topics Concern     Not on file     Social History Narrative     No past medical history on file.  No past surgical history on file.  No family history on file.      ROS   A complete ROS was otherwise negative except as noted in the HPI and the end of the note.  There were no vitals taken for this visit.  Exam:   GENERAL APPEARANCE: Well developed, well nourished, alert, and in no apparent distress.  EYES: PERRL, EOMI, conjunctiva clear non-injected  HENT: Nasal mucosa with no edema and no discharge. No nasal polyps. Mild facial tenderness.  EARS: Canals clear, TMs normal  MOUTH: Oral mucosa is moist, without any lesions, no tonsillar enlargement, no oropharyngeal exudate.  NECK: Supple, no masses, no thyromegaly.  LYMPHATICS: No significant cervical, or supraclavicular nodes.  RESP: Good air flow throughout.  No crackles. No rhonchi. No wheezes.  CV: Normal S1, S2, regular rhythm, normal rate. No murmur.  No rub. No gallop. No LE edema.   MS: Extremities  normal. No clubbing. No cyanosis.  SKIN: No rashes noted  NEURO: Speech normal, normal strength and tone, normal gait and stance  PSYCH: Normal mentation, orientation to person, place, and time.  Results:38 percutaneous environmental allergen (and 2 controls) extracts placed by MA and read by MD.  All negative.  26 intradermal environmental allergen extracts placed by MA and read by MD, mild alternaria mold positive.          Assessment and plan: Davida has a longstanding history of cough that interestingly got better years ago when she was on allergy shots; however, at this time she is not showing much in regards to allergens except for Alternaria mold.  Allergens tend to get better as people get older, so that is a possibility at her age.  It is also possible the allergy shots maintain a lasting benefit that were not picked up on any allergens.  At this time, without showing strong allergens except for a mild Alternaria mold which is typically an outdoor mold or in plants, I recommended her to continue her evaluation with other physicians.  She is working with Speech to help with the cough which has helped.  Some control measures for Alternaria mold were given to her.  If she has not noticed any benefit on the nasal steroids, she can stop these; however, it may still be beneficial.  She can stop the antihistamines.  I can see her back as needed.               Answers for HPI/ROS submitted by the patient on 6/7/2017   General Symptoms: Yes  Skin Symptoms: Yes  HENT Symptoms: Yes  EYE SYMPTOMS: No  HEART SYMPTOMS: No  LUNG SYMPTOMS: Yes  INTESTINAL SYMPTOMS: No  URINARY SYMPTOMS: Yes  GYNECOLOGIC SYMPTOMS: No  BREAST SYMPTOMS: No  SKELETAL SYMPTOMS: Yes  BLOOD SYMPTOMS: No  NERVOUS SYSTEM SYMPTOMS: No  MENTAL HEALTH SYMPTOMS: Yes  Fever: No  Loss of appetite: No  Weight loss: No  Weight gain: No  Fatigue: Yes  Night sweats: No  Chills: No  Increased stress: Yes  Excessive hunger: No  Excessive thirst:  Yes  Feeling hot or cold when others believe the temperature is normal: No  Loss of height: No  Post-operative complications: No  Surgical site pain: Yes  Hallucinations: No  Change in or Loss of Energy: Yes  Hyperactivity: No  Confusion: No  Changes in hair: No  Changes in moles/birth marks: No  Itching: Yes  Rashes: Yes  Changes in nails: No  Acne: No  Hair in places you don't want it: No  Change in facial hair: No  Warts: Yes  Non-healing sores: No  Scarring: Yes  Flaking of skin: Yes  Color changes of hands/feet in cold : No  Sun sensitivity: No  Skin thickening: No  Ear pain: No  Ear discharge: No  Hearing loss: No  Tinnitus: No  Nosebleeds: No  Congestion: No  Sinus pain: No  Trouble swallowing: No   Voice hoarseness: No  Mouth sores: No  Sore throat: No  Tooth pain: No  Gum tenderness: Yes  Bleeding gums: Yes  Change in taste: No  Change in sense of smell: No  Dry mouth: Yes  Hearing aid used: No  Neck lump: No  Cough: Yes  Sputum or phlegm: Yes  Coughing up blood: No  Difficulty breating or shortness of breath: No  Snoring: No  Wheezing: No  Difficulty breathing on exertion: Yes  Respiratory pain: No  Nighttime Cough: Yes  Difficulty breathing when lying flat: No  Trouble holding urine or incontinence: Yes  Pain or burning: No  Trouble starting or stopping: No  Increased frequency of urination: No  Blood in urine: No  Decreased frequency of urination: No  Frequent nighttime urination: No  Flank pain: No  Difficulty emptying bladder: No  Back pain: Yes  Muscle aches: Yes  Neck pain: Yes  Swollen joints: Yes  Joint pain: Yes  Bone pain: No  Muscle cramps: No  Muscle weakness: Yes  Joint stiffness: Yes  Bone fracture: No  Nervous or Anxious: Yes  Depression: Yes  Trouble sleeping: No  Trouble thinking or concentrating: No  Mood changes: No  Panic attacks: No

## 2017-06-13 NOTE — PROGRESS NOTES
"[facility] VOICE [facility]  Yousuf Fung Jr., M.D., F.A.C.S.  Judy Villanueva M.D., M.P.H.  Irma Ramirez, Ph.D., CCC/SLP  Sheri Medina M.M. (voice), M.KRISTEN., CCC/SLP  Ehsan Noav M.M. (voice), MXIMENA., [facility]/SLP    [facility] VOICE [facility]  VOICE/SPEECH/BREATHING THERAPY PROGRESS REPORT    Patient: Davida Reynaga  Date of Service: 6/12/2017    PROGRESS SINCE LAST SESSION  Ms. Reynaga was last seen on 4/17/17. At that time, she worked on learning therapeutic activities to improve her voice quality and comfort, secondary to chronic cough and dysphonia; allowing her to meet personal and professional vocal demands and fully engage in activities of daily living.   This session was completed while [facility] was down.    Regarding practice, Ms. Reynaga reports the following:     Irregular practice with and without the recording    the recording is helpful    The after visit summary is helpful to facilitate practice.    voice is improved during the exercises; improving carryover to spontaneous conversation    Ms. Reynaga also states that:    Cough had improved for a while; she found the semi-occluded vocal tract exercises with straw phonation and water resistance to be most helpful.    Had 2-3 coughing \"jags\" this week    Severity of cough is rated at 2-3/10 (10 being most severe) over the past week.    Voice quality has improved and is less rough    Ms. Reynaga presents today with the following:  Voice quality:    Mild intermittent roughness    Habitual pitch is around D3    A pitch glide task today showed that Ms. Reynaga can vocalize down to Gb2 and up to A4.     There is some instability around A3  Cough/ Throat clear:    Mild to moderate in severity    Had 1x coughing \"jag\" during today's session, which was difficult to discontinue.  She found relaxing to be helpful.  Other strategies: repeated sniffs, gargling, sipping water and bubbles, were helpful for calming her throat.    THERAPEUTIC ACTIVITIES  Today Ms. Reynaga participated " "in the following therapeutic activities:    Asked many questions about the nature of her symptoms, and I answered all of these thoroughly. [place] about the balance between the TA/CT  Muscles; this was helpful.    Demonstrated previous exercises.    demonstrated improved technique    appropriate redirection provided    instruction provided for increased level of complexity/difficulty    Modified exercises to add phonation to the optimal flowing airstream.    Semi-occluded vocal tract exercises with \"hum\"/ \"hun\"/ lip trills/ straw phonation with water resistance were most facilitating    [place] a variety of exercises to allow for flexibility during her daily activities.    Instructed to use as a voice warm up, cool down, coordination of breath flow with phonation, and for tissue mobilization.    good learning, but will need practice     Exercises for improved airflow during phonation.    speech material with easy onsets and glides was facilitating    Instructed at the word and phrase level.    Training with intervals of a descending 5th and an arpeggio pattern was helpful    learned techniques to reduce glottal reyes and improve breath flow    Exercises to experience a more forward sensation during phonation.    speech material with nasal continuants was facilitating    speech material that elicits a high, forward tongue position was facilitating, as well as bilabial plosives.    able to recognize improvement in quality and comfort    able to progress to level of words and phrases    Training with intervals of a descending 5th and an arpeggio pattern was helpful    good learning, but will need practice    Funkley concepts of an optimal regimen for practice.    she should use an interval schedule of practice, with brief periods of practice frequently throughout each day    Funkley concepts of volitional practice to facilitate motor learning.    I provided an after visit summary to help facilitate practice.    An audio " recording of today's therapeutic activities was provided, to facilitate practice.    IMPRESSIONS/GOALS/PLAN  Ms. Reynaga had a productive session of therapy today, working on techniques/strategies/exercises that will help her achieve her goal of acceptable and comfortable voice use, secondary to chronic cough and dysphonia; allowing her to meet personal and professional vocal demands and fully engage in activities of daily living.      Progress toward long-term goals:   Adequate progress; objective measures will be completed during her next session     Goals for this practice period:     practice all exercises according to instructions    incorporate techniques into daily vocal activities    maintain vigilance for vocal technique     Plan: I will see Ms. Reynaga in 4 weeks to work on education, modification, and carryover of therapeutic activities to more complex phonatory tasks.     PRIMARY ICD-10 code:  R05 (Chronic Cough)  SECONDARY ICD-10 code:  R49.0 (Dysphonia)       TOTAL SERVICE TIME: 60 minutes  TREATMENT (19879): 60 minutes  NO CHARGE FACILITY FEE (95428)      Sheri Medina M.M. (voice), M.A., CCC/SLP  Speech-Language Pathologist  [facility]  [phone]

## 2019-10-01 ENCOUNTER — HEALTH MAINTENANCE LETTER (OUTPATIENT)
Age: 74
End: 2019-10-01

## 2019-12-15 ENCOUNTER — HEALTH MAINTENANCE LETTER (OUTPATIENT)
Age: 74
End: 2019-12-15

## 2021-01-15 ENCOUNTER — HEALTH MAINTENANCE LETTER (OUTPATIENT)
Age: 76
End: 2021-01-15

## 2021-09-04 ENCOUNTER — HEALTH MAINTENANCE LETTER (OUTPATIENT)
Age: 76
End: 2021-09-04

## 2021-10-30 ENCOUNTER — HEALTH MAINTENANCE LETTER (OUTPATIENT)
Age: 76
End: 2021-10-30

## 2022-02-19 ENCOUNTER — HEALTH MAINTENANCE LETTER (OUTPATIENT)
Age: 77
End: 2022-02-19

## 2022-10-16 ENCOUNTER — HEALTH MAINTENANCE LETTER (OUTPATIENT)
Age: 77
End: 2022-10-16

## 2023-04-01 ENCOUNTER — HEALTH MAINTENANCE LETTER (OUTPATIENT)
Age: 78
End: 2023-04-01

## (undated) RX ORDER — ALBUTEROL SULFATE 0.83 MG/ML
SOLUTION RESPIRATORY (INHALATION)
Status: DISPENSED
Start: 2017-03-22

## (undated) RX ORDER — DIPHENHYDRAMINE HYDROCHLORIDE 50 MG/ML
INJECTION INTRAMUSCULAR; INTRAVENOUS
Status: DISPENSED
Start: 2017-03-22

## (undated) RX ORDER — ONDANSETRON 2 MG/ML
INJECTION INTRAMUSCULAR; INTRAVENOUS
Status: DISPENSED
Start: 2017-03-22

## (undated) RX ORDER — LIDOCAINE HYDROCHLORIDE AND EPINEPHRINE 10; 10 MG/ML; UG/ML
INJECTION, SOLUTION INFILTRATION; PERINEURAL
Status: DISPENSED
Start: 2017-03-22

## (undated) RX ORDER — FENTANYL CITRATE 50 UG/ML
INJECTION, SOLUTION INTRAMUSCULAR; INTRAVENOUS
Status: DISPENSED
Start: 2017-03-22

## (undated) RX ORDER — LIDOCAINE HYDROCHLORIDE 10 MG/ML
INJECTION, SOLUTION EPIDURAL; INFILTRATION; INTRACAUDAL; PERINEURAL
Status: DISPENSED
Start: 2017-03-22